# Patient Record
Sex: FEMALE | Race: WHITE | NOT HISPANIC OR LATINO | URBAN - METROPOLITAN AREA
[De-identification: names, ages, dates, MRNs, and addresses within clinical notes are randomized per-mention and may not be internally consistent; named-entity substitution may affect disease eponyms.]

---

## 2019-07-24 ENCOUNTER — PROCEDURE VISIT (OUTPATIENT)
Dept: OTOLARYNGOLOGY | Facility: CLINIC | Age: 29
End: 2019-07-24
Payer: COMMERCIAL

## 2019-07-24 ENCOUNTER — OFFICE VISIT (OUTPATIENT)
Dept: OTOLARYNGOLOGY | Facility: CLINIC | Age: 29
End: 2019-07-24
Payer: COMMERCIAL

## 2019-07-24 VITALS
HEART RATE: 85 BPM | WEIGHT: 145 LBS | DIASTOLIC BLOOD PRESSURE: 78 MMHG | HEIGHT: 66 IN | BODY MASS INDEX: 23.3 KG/M2 | SYSTOLIC BLOOD PRESSURE: 108 MMHG | OXYGEN SATURATION: 97 %

## 2019-07-24 DIAGNOSIS — H81.391 PERIPHERAL VERTIGO INVOLVING RIGHT EAR: ICD-10-CM

## 2019-07-24 DIAGNOSIS — H90.41 SENSORINEURAL HEARING LOSS (SNHL) OF RIGHT EAR WITH UNRESTRICTED HEARING OF LEFT EAR: ICD-10-CM

## 2019-07-24 DIAGNOSIS — H83.01 LABYRINTHITIS OF RIGHT EAR: Primary | ICD-10-CM

## 2019-07-24 DIAGNOSIS — H93.11 TINNITUS OF RIGHT EAR: ICD-10-CM

## 2019-07-24 DIAGNOSIS — G44.52 NEW DAILY PERSISTENT HEADACHE: ICD-10-CM

## 2019-07-24 PROCEDURE — 99244 OFF/OP CNSLTJ NEW/EST MOD 40: CPT | Mod: 25 | Performed by: OTOLARYNGOLOGY

## 2019-07-24 PROCEDURE — 99999 PR OFFICE/OUTPT VISIT,PROCEDURE ONLY: CPT | Performed by: AUDIOLOGIST

## 2019-07-24 PROCEDURE — 92567 TYMPANOMETRY: CPT | Performed by: AUDIOLOGIST

## 2019-07-24 PROCEDURE — 92557 COMPREHENSIVE HEARING TEST: CPT | Performed by: AUDIOLOGIST

## 2019-07-24 RX ORDER — CETIRIZINE HYDROCHLORIDE 10 MG/1
10 TABLET ORAL DAILY
COMMUNITY
End: 2019-07-24 | Stop reason: ALTCHOICE

## 2019-07-24 RX ORDER — ESCITALOPRAM OXALATE 20 MG/1
TABLET ORAL
COMMUNITY
Start: 2019-07-02

## 2019-07-24 RX ORDER — ALBUTEROL SULFATE 90 UG/1
AEROSOL, METERED RESPIRATORY (INHALATION)
COMMUNITY
Start: 2019-05-06 | End: 2019-12-19 | Stop reason: ALTCHOICE

## 2019-07-24 RX ORDER — ONDANSETRON 4 MG/1
TABLET, ORALLY DISINTEGRATING ORAL
Qty: 60 TABLET | Refills: 3 | Status: SHIPPED | OUTPATIENT
Start: 2019-07-24 | End: 2019-07-25 | Stop reason: SDUPTHER

## 2019-07-24 RX ORDER — BUPROPION HYDROCHLORIDE 300 MG/1
TABLET ORAL
COMMUNITY
Start: 2019-07-14

## 2019-07-24 RX ORDER — MONTELUKAST SODIUM 10 MG/1
TABLET ORAL
COMMUNITY
Start: 2019-07-02

## 2019-07-24 RX ORDER — POLYETHYLENE GLYCOL 3350 17 G/17G
17 POWDER, FOR SOLUTION ORAL DAILY
COMMUNITY

## 2019-07-24 RX ORDER — BUDESONIDE AND FORMOTEROL FUMARATE DIHYDRATE 160; 4.5 UG/1; UG/1
2 AEROSOL RESPIRATORY (INHALATION) 2 TIMES DAILY
COMMUNITY
End: 2019-07-24 | Stop reason: ALTCHOICE

## 2019-07-24 RX ORDER — PREDNISONE 10 MG/1
TABLET ORAL
Qty: 50 TABLET | Refills: 0 | Status: SHIPPED | OUTPATIENT
Start: 2019-07-24 | End: 2019-09-11 | Stop reason: ALTCHOICE

## 2019-07-24 RX ORDER — LORAZEPAM 0.5 MG/1
0.5 TABLET ORAL 2 TIMES DAILY PRN
Refills: 0 | COMMUNITY
Start: 2019-05-03

## 2019-07-24 RX ORDER — OLOPATADINE HYDROCHLORIDE 665 UG/1
SPRAY NASAL
Qty: 1 BOTTLE | Refills: 6 | Status: SHIPPED | OUTPATIENT
Start: 2019-07-24 | End: 2019-09-11 | Stop reason: ALTCHOICE

## 2019-07-24 RX ORDER — NORETHINDRONE 0.35 MG/1
TABLET ORAL
COMMUNITY
Start: 2019-07-02

## 2019-07-24 RX ORDER — METOPROLOL SUCCINATE 50 MG/1
TABLET, EXTENDED RELEASE ORAL
COMMUNITY
Start: 2019-07-03 | End: 2021-10-11 | Stop reason: ALTCHOICE

## 2019-07-24 ASSESSMENT — ENCOUNTER SYMPTOMS
CHEST TIGHTNESS: 1
PALPITATIONS: 0
BACK PAIN: 0
SHORTNESS OF BREATH: 0
ARTHRALGIAS: 1
DIARRHEA: 0
POLYPHAGIA: 0
WEAKNESS: 0
CONSTIPATION: 0
MYALGIAS: 1
VOICE CHANGE: 0
NERVOUS/ANXIOUS: 1
RHINORRHEA: 1
HEADACHES: 1
DIZZINESS: 0
COUGH: 0
TROUBLE SWALLOWING: 0
FEVER: 0
NAUSEA: 0
SLEEP DISTURBANCE: 0
FREQUENCY: 0
DYSPHORIC MOOD: 1
SINUS PRESSURE: 0
VOMITING: 0
ADENOPATHY: 0
WHEEZING: 0
FATIGUE: 0

## 2019-07-24 NOTE — PROGRESS NOTES
"    [facility]  [address], [address]  [place] [address]  Phone: [phone]  Fax: [phone]      Patient ID: Mona Traylor                              : 1990    Visit Date: 2019    Referring Provider: Valente Patel MD    Chief Complaint: Vertigo and tinnitus      Mona Traylor is a 29 y.o. female who presented to the [place] office today for consultation in regard to vertigo.    Mona developed an acute tension headache and nausea on .  She stayed home from work that day.  After resting for about 4 hours, she sat up in bed and experienced an acute onset of vertigo and \"very wavy vision\".  She did not have visual scotoma.  She did have severe sweating and intense nausea.  She vomited once.  She does have a history of migraine headache but that this was different than any headache that she had had before.  She had no weakness in her extremities nor any change in her voice.  She rested over the weekend.  She has definitely improved however she still feels dizzy when she moves around quickly.  When she lays flat she feels uncomfortable.  She likes to have her head propped up when she is in bed.  Along with the dizziness she has had tinnitus and significant loss of hearing on the right.    Mona has actually experienced tinnitus in the right ear for about 6 months.  She has also felt that her hearing on that side was slowly diminishing during this interval but that it acutely changed over the weekend.  She has never undergone otologic surgery nor has she been exposed to excessive amounts of loud noise.  She works as a  at [employer].  It should be noted that physicians have checked her right ear intermittently and have told her that she has \"chronic noninfected fluid\" in that ear.    Review of Systems   Constitutional: Negative for fatigue and fever.   HENT: Positive for congestion, hearing loss, postnasal drip, rhinorrhea and tinnitus. " "Negative for nosebleeds, sinus pressure, trouble swallowing and voice change.         Vertigo.   Eyes: Negative for visual disturbance.   Respiratory: Positive for chest tightness. Negative for cough, shortness of breath and wheezing.    Cardiovascular: Negative for chest pain and palpitations.   Gastrointestinal: Negative for constipation, diarrhea, nausea and vomiting.   Endocrine: Negative for polyphagia and polyuria.   Genitourinary: Negative for frequency.   Musculoskeletal: Positive for arthralgias and myalgias. Negative for back pain and gait problem.   Skin: Negative for rash.   Allergic/Immunologic: Negative for environmental allergies.   Neurological: Positive for headaches. Negative for dizziness and weakness.   Hematological: Negative for adenopathy.   Psychiatric/Behavioral: Positive for dysphoric mood. Negative for sleep disturbance. The patient is nervous/anxious.      Current Medications: Bupropion xl, jeremi, escitalopram, lorazepam, montelukast, polyethylene glycol, proair hfa, and toprol xl.    Past Medical History: Allergic rhinitis.  Asthma.  Hypertension.    Past Surgical History: Status post wisdom tooth extraction, left knee meniscus repair and LEEP procedure.    Social History: The patient is single.  She does not have children.  She is a .  She drinks 2 alcoholic beverages per week.  She does not use tobacco.  She has been exposed to loud music at concerts.    Family History: Father: Congestive heart failure.    Allergies: Reglan. Penicillin.  Sulfa drugs.    Physical Exam:  Vitals: /78 (BP Location: Left upper arm, Patient Position: Sitting)   Pulse 85   Ht 1.676 m (5' 6\")   Wt 65.8 kg (145 lb)   SpO2 97%   BMI 23.40 kg/m²   General:  Well-developed, well-nourished 29 y.o. in no acute distress.  Voice: Normal without hoarseness, breathiness or stridor.  Head/face:  No scars or lesions.  No parotid masses. No presinus tenderness. Seventh cranial nerves " intact.  Eyes:  Extraocular movements and gaze alignment normal.  Left beating nystagmus and fields of gaze.  Ears: Auricles normal.  External auditory canals free of cerumen.  Tympanic membranes clear, mobile but retracted more so on the right than the left.  Nose:  Dorsum straight.  Septum sinusoidal.  Turbinates pale purple and edematous but normal in size and orientation.  No pus, polyps or crusts.  Oral Cavity/oropharynx:  Normal tongue thrust. Normal gag reflex. No masses, leukoplakia, erythroplakia, ulcers or other abnormalities at the tongue, floor of mouth, buccal mucosa, palate or posterior pharyngeal wall.  Larynx/hypopharynx:  Normal supraglottis.  Vocal folds smooth and white.  Arytenoids ededmatous and mobile.  Interarytenoid and post-glottic mucosa edematous.  No masses at the base of tongue, vallecula or piriform sinuses.  Neck:  No masses, adenopathy, cervical spasm or thyroid enlargement.  Cranial Nerves II through XII: Grossly intact except for right cranial nerve VIII.  Lungs: Clear throughout.  Heart: Regular rate and rhythm.  Mental status: Awake, alert and oriented ×3.  No depression or anxiety.    Audiogram: Moderate, low-frequency rising sensorineural hearing loss on the right from 250 to 1000 Hz.  Mild sensorineural hearing loss above 4000 Hz on the right.  Normal hearing across all frequencies on the left.  Discrimination scores: 92% at 55 dB on the right, 100% at 40 dB on the left.    Tympanogram: Normal bilaterally.    Impression:  1.  Right acute labyrinthitis with hearing loss, tinnitus and vertigo versus first episode of Ménière's syndrome.  It is possible that the patient had symptoms of cochlear Ménière's syndrome as many as 6 months ago and has now developed the full syndrome with vertigo as well.  Alternative diagnoses include otologic migraine variant given the episode of head pain that preceded the full symptoms over the weekend.  Rule out retrocochlear lesion.  Marylou had  a long talk about all of these alternative diagnoses.  It will take some time to sort this out.    2.  Allergic rhinitis.  3.  Asthma.    Recommendations and Plan:  1.  Prednisone tapering down from 60 mg daily in divided doses to none over 3 weeks  2.  Simply Saline nasal mist, 2 squirts to each nostril twice daily.  3.  Flonase Sensimist, 1 squirt to each nostril in the morning.  4.  Olopatadine, 1 squirt to each nostril in the evening.  5.  Videonystagmogram.  6.  MRI brain and internal auditory canals.    7.  Meclizine, 6.25 mg once or twice daily when necessary.    8.  Zofran, 4 mg on the tongue once or twice daily for nausea and vomiting.    9.  When able, begin the habituation exercises twice daily.  10.  Follow-up in 1 month.        Krissy Hunter MD

## 2019-07-24 NOTE — PATIENT INSTRUCTIONS
1.  Prednisone twice daily for 3 days and then with breakfast for 3 weeks.  Follow the tapering dose very carefully.  2.  Simply Saline nasal mist, 2 squirts to each nostril twice daily.  3.  Flonase Sensimist, 1 squirt to each nostril in the morning.  4.  Olopatadine, 1 squirt to each nostril in the evening.  5.  Videonystagmogram to analyze in her ear balance function.  6.  MRI brain and inner ears.    7.  When able, begin the habituation exercises twice daily.  8.  Follow-up in 1 month.

## 2019-07-25 ENCOUNTER — TELEPHONE (OUTPATIENT)
Dept: OTOLARYNGOLOGY | Facility: CLINIC | Age: 29
End: 2019-07-25

## 2019-07-25 RX ORDER — ONDANSETRON 4 MG/1
TABLET, ORALLY DISINTEGRATING ORAL
Qty: 60 TABLET | Refills: 3 | Status: SHIPPED | OUTPATIENT
Start: 2019-07-25 | End: 2020-09-18 | Stop reason: SDUPTHER

## 2019-07-25 NOTE — TELEPHONE ENCOUNTER
As can be seen in the epic medicine module, this was sent yesterday afternoon, 60 pills and 3 refills.  She should contact the pharmacy it is there electronically.  This was sent to [facility] on [address] in [place].

## 2019-07-25 NOTE — TELEPHONE ENCOUNTER
Prescription for Zofran was never sent to pharmacy.  Prescription can be sent to pharmacy listed in patient chart.

## 2019-07-31 ENCOUNTER — TELEPHONE (OUTPATIENT)
Dept: TRANSFER UNIT | Age: 29
End: 2019-07-31

## 2019-07-31 RX ORDER — FLUTICASONE PROPIONATE 50 MCG
1 SPRAY, SUSPENSION (ML) NASAL DAILY
COMMUNITY
End: 2020-07-28

## 2019-08-01 ENCOUNTER — HOSPITAL ENCOUNTER (OUTPATIENT)
Dept: RADIOLOGY | Age: 29
Discharge: HOME | End: 2019-08-01
Attending: OTOLARYNGOLOGY
Payer: COMMERCIAL

## 2019-08-01 DIAGNOSIS — H93.11 TINNITUS OF RIGHT EAR: ICD-10-CM

## 2019-08-01 DIAGNOSIS — G44.52 NEW DAILY PERSISTENT HEADACHE: ICD-10-CM

## 2019-08-01 RX ORDER — GADOBUTROL 604.72 MG/ML
6.5 INJECTION INTRAVENOUS ONCE
Status: COMPLETED | OUTPATIENT
Start: 2019-08-01 | End: 2019-08-01

## 2019-08-01 RX ADMIN — GADOBUTROL 6.5 ML: 604.72 INJECTION INTRAVENOUS at 17:03

## 2019-08-02 ENCOUNTER — TELEPHONE (OUTPATIENT)
Dept: OTOLARYNGOLOGY | Facility: CLINIC | Age: 29
End: 2019-08-02

## 2019-08-02 NOTE — TELEPHONE ENCOUNTER
Patient calling in would like to know when taking the Prednisone should she follow the tapering dose or steady? She also shared since using the Flonase and Olopatadine nasal sprays her PND has been really thick and its harder for her to clear her throat. I did inform patient you will be calling her later this evening at 6pm; the best number to reach her on is [phone]. Patient voiced if you would like to address these concerns during the call she is okay with waiting to hear from you.

## 2019-08-02 NOTE — TELEPHONE ENCOUNTER
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  She already has an appointment scheduled with Dr. Frankie Mauricio at [place].  We had scheduled an appointment with Dr. Lavelle Granados at [place], but we will cancel that process.    She has had exactly one severe episode of vertigo since I saw her last.  For that reason she has not started habituation exercises yet.  She finds the sprays to drying so I suggested that she eliminate olopatadine and increase her use of saline nasal mist.  She will try to continue on the Flonase.      She has obtained a disc and the written record for her MRI.  She will follow-up with me as planned in regard to the ears.  The motion initiation issues that she has may relate directly to the glioma, but the vertigo seems to be a separate issue.  We discussed this at length.     junctional escpe beats, block pac, hr down to 39

## 2019-08-14 ENCOUNTER — TELEPHONE (OUTPATIENT)
Dept: OTOLARYNGOLOGY | Facility: CLINIC | Age: 29
End: 2019-08-14

## 2019-08-14 NOTE — TELEPHONE ENCOUNTER
Please cancel the 8/26 visit and schedule a visit for the week after the VNG. I should have the results by then.

## 2019-08-14 NOTE — TELEPHONE ENCOUNTER
Patient calling in would like to know should she come in to see you before or after she gets the VNG done?  She is scheduled for a visit with you on 8/26/19, but she wasn't able to get a appointment for the VNG until 9/5/19

## 2019-09-05 ENCOUNTER — TELEPHONE (OUTPATIENT)
Dept: OTOLARYNGOLOGY | Facility: CLINIC | Age: 29
End: 2019-09-05

## 2019-09-05 DIAGNOSIS — H83.2X1 VESTIBULAR HYPOFUNCTION OF RIGHT EAR: Primary | ICD-10-CM

## 2019-09-05 NOTE — TELEPHONE ENCOUNTER
Please let the patient know that I received the report of the VNG from today.  It shows weak balance signals at both ears.  It is unclear if this represents Ménière's disease or even an unusual migraine.  I would like her to continue on all of her medications and I want her to keep the sodium in her diet low, less than 3000 mg/day.  Also, I would like her to begin vestibular therapy at [facility].  I wrote a prescription for this.  She should have an upcoming appointment with me very soon.  If there is not one on the schedule, put her on for Wednesday, September 11.

## 2019-09-06 NOTE — TELEPHONE ENCOUNTER
I informed Mona of all.  Her appointment is actually 10/10/19 but she said it was supposed to be next Thursday.  I offered her several options for this Wednesday 9/11/19.  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 She can't even do lunch time.  She wanted to know if you could see her on 9/11/19 at 4:00.  Please advise.

## 2019-09-11 ENCOUNTER — OFFICE VISIT (OUTPATIENT)
Dept: OTOLARYNGOLOGY | Facility: CLINIC | Age: 29
End: 2019-09-11
Payer: COMMERCIAL

## 2019-09-11 VITALS
WEIGHT: 140 LBS | DIASTOLIC BLOOD PRESSURE: 70 MMHG | OXYGEN SATURATION: 98 % | BODY MASS INDEX: 22.5 KG/M2 | HEIGHT: 66 IN | HEART RATE: 89 BPM | SYSTOLIC BLOOD PRESSURE: 104 MMHG

## 2019-09-11 DIAGNOSIS — H81.4 VERTIGO OF CENTRAL ORIGIN, UNSPECIFIED LATERALITY: Primary | ICD-10-CM

## 2019-09-11 DIAGNOSIS — H69.93 DYSFUNCTION OF BOTH EUSTACHIAN TUBES: ICD-10-CM

## 2019-09-11 DIAGNOSIS — H93.11 TINNITUS OF RIGHT EAR: ICD-10-CM

## 2019-09-11 DIAGNOSIS — H83.2X3 VESTIBULAR HYPOFUNCTION OF BOTH EARS: ICD-10-CM

## 2019-09-11 DIAGNOSIS — H93.231 HYPERACUSIS OF RIGHT EAR: ICD-10-CM

## 2019-09-11 DIAGNOSIS — H90.41 SENSORINEURAL HEARING LOSS (SNHL) OF RIGHT EAR WITH UNRESTRICTED HEARING OF LEFT EAR: ICD-10-CM

## 2019-09-11 PROBLEM — F32.A ANXIETY AND DEPRESSION: Status: ACTIVE | Noted: 2019-08-13

## 2019-09-11 PROBLEM — K58.9 IBS (IRRITABLE BOWEL SYNDROME): Status: ACTIVE | Noted: 2019-08-13

## 2019-09-11 PROBLEM — F41.9 ANXIETY AND DEPRESSION: Status: ACTIVE | Noted: 2019-08-13

## 2019-09-11 PROBLEM — I10 HYPERTENSION: Status: ACTIVE | Noted: 2019-08-13

## 2019-09-11 PROCEDURE — 99213 OFFICE O/P EST LOW 20 MIN: CPT | Performed by: OTOLARYNGOLOGY

## 2019-09-11 ASSESSMENT — ENCOUNTER SYMPTOMS
VOICE CHANGE: 0
HEADACHES: 1
WEAKNESS: 0
WHEEZING: 0
POLYPHAGIA: 0
PALPITATIONS: 0
TROUBLE SWALLOWING: 0
ARTHRALGIAS: 0
VOMITING: 0
FATIGUE: 0
MYALGIAS: 0
SHORTNESS OF BREATH: 0
DIZZINESS: 1
SLEEP DISTURBANCE: 0
CONSTIPATION: 0
SINUS PRESSURE: 0
ADENOPATHY: 0
BACK PAIN: 0
FREQUENCY: 0
NAUSEA: 1
RHINORRHEA: 0
NERVOUS/ANXIOUS: 0
COUGH: 0
FEVER: 0
SORE THROAT: 1
DYSPHORIC MOOD: 0
DIARRHEA: 0

## 2019-09-11 NOTE — PROGRESS NOTES
[facility]  [address], [address]  [place] [address]  Phone: [phone]  Fax: [phone]      Patient ID: Mona Traylor                              : 1990    Visit Date: 2019  Referring Provider: Valente Patel MD    Chief Complaint: Results (VNG)      Mona Traylor returned to the [place] office today in regard to history of acute labyrinthitis with right sensorineural hearing loss, tinnitus, hyperacusis and intermittent vertigo.    I last saw Mona at her first visit in July.  She had evidence of right acute labyrinthitis with hearing loss, tinnitus and vertigo.  We discussed the fact that the symptoms could also represent the first episode of Ménière's disease.  Migraine associated vertigo was also in the differential.  I placed her on high-dose prednisone as well as saline, Flonase and olopatadine for allergic rhinitis.  Since seeing me last she has undergone both an MRI of the brain and internal auditory canals and video nystagmogram.  The MRI appeared to reveal a low-grade glioma in the frontal motor cortex.  Fortunately,  Dr. Frankie Mauricio, the chief of neurosurgery at [place] believes that this lesion is more likely to be a benign fibrous mass such as a hamartoma.  Mona will undergo another MRI next month.    Video nystagmogram revealed bilateral labyrinthine hypofunction which was unexpected.  This could be consistent with Ménière's disease or migraine.  The diagnosis of acute labyrinthitis with slow resolution is still in play as well.  The frontal mass, regardless of tissue type, is not the source of the patient's symptoms.    At present, Mona feels a generalized feeling of subtle dizziness virtually at all times.  She has only had true vertigo 2 or 3 times since she saw me last.  She has used a small amount of meclizine each time the vertigo has occurred.  Sometimes she has nausea without vertigo and takes the Zofran.  The fullness in her right ear has decreased as  "has the tinnitus.  However, she still has hyperacusis if she is in a very noisy place.  He also has acute symptoms at the onset of a viral upper respiratory infection.  Over the past 48 hours, her throat has become sore and her nose has become stuffy.  She has thick clear postnasal drainage.  She does not yet have a cough, shortness of breath or wheezing.      Review of Systems   Constitutional: Negative for fatigue and fever.   HENT: Positive for congestion, hearing loss, postnasal drip, sore throat and tinnitus. Negative for nosebleeds, rhinorrhea, sinus pressure, trouble swallowing and voice change.         Vertigo.  Hyperacusis.   Eyes: Negative for visual disturbance.   Respiratory: Negative for cough, shortness of breath and wheezing.    Cardiovascular: Negative for chest pain and palpitations.   Gastrointestinal: Positive for nausea. Negative for constipation, diarrhea and vomiting.   Endocrine: Negative for polyphagia and polyuria.   Genitourinary: Negative for frequency.   Musculoskeletal: Negative for arthralgias, back pain, gait problem and myalgias.   Skin: Negative for rash.   Allergic/Immunologic: Negative for environmental allergies.   Neurological: Positive for dizziness and headaches. Negative for weakness.   Hematological: Negative for adenopathy.   Psychiatric/Behavioral: Negative for dysphoric mood and sleep disturbance. The patient is not nervous/anxious.      Current Medications: Bupropion xl, jeremi, escitalopram, fluticasone propionate, lorazepam, montelukast, ondansetron odt, polyethylene glycol, proair hfa, and toprol xl.    Physical Exam:  Vitals: /70 (BP Location: Left upper arm, Patient Position: Sitting)   Pulse 89   Ht 1.676 m (5' 6\")   Wt 63.5 kg (140 lb)   SpO2 98%   BMI 22.60 kg/m²   General:  Well-developed, well-nourished 29 y.o. who is in no acute distress.  Voice: Normal without hoarseness, breathiness or stridor.  Head/face:  No scars or lesions.  No parotid masses. " No presinus tenderness. Seventh cranial nerves intact.  Eyes:  Extraocular movements and gaze alignment normal.  No spontaneous or inducible nystagmus.  Ears: Auricles normal.  External auditory canals free of cerumen.  Tympanic membranes clear, mobile and without retraction or scar.  Nose:  Dorsum straight.  Septum sinsusoidal.  Turbinates purple and edematous but normal in size and orientation.  No pus, polyps or crusts.  Oral Cavity/oropharynx: Normal tongue thrust. Normal gag reflex. No masses, leukoplakia, erythroplakia, ulcers or other abnormalities at the tongue, floor of mouth or buccal mucosa.  Vascular ectasia scattered across the soft palate and posterior pharyngeal wall with clear secretions consistent with mild viral pharyngitis.  Larynx/hypopharynx:  Normal supraglottis.  Vocal folds smooth and white.  Arytenoids edematous and mobile.  Interarytenoid and post-glottic mucosa edematous.  No masses at the base of tongue, vallecula or piriform sinuses.  Neck:  No masses, adenopathy, cervical spasm or thyroid enlargement.  Cranial Nerves II through XII: Grossly intact step for right cranial nerve VIII.  Mental status: Awake, alert and oriented ×3.  No anxiety or depression.      Impression:  1.  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  Slowly resolving right acute labyrinthitis and migraine associated vertigo are also possibilities.  2.  Acute viral URI just beginning.  3.  Allergic rhinitis.  4.  Relative dehydration and consistently low blood pressure at several doctor visits.  The patient needs to hydrate herself better through the day.  I am hesitant to place her on a diuretic at this time.    Recommendations and Plan:  1. Trial vestibular therapy.  Patient is aware that we will discontinue this therapy if her daily dizziness increases.  2. Drink plenty of water and keep a low sodium diet. Sodium tracker dannielle for [place].  Eat 3 meals per day!!  3. Consider musician's plugs.  4. Follow  up in 1 month for audiogram.  5. Increase the saline nasal spray to 3x/day. Afrin 12 hour decongestant, 2 squirts to each nostril twice daily for 3 days. Gargle salt water with peroxide. Mucinex 600 mg daily. Vitamin C - EmergenC or 1000 mg/day.  Resume Flonase in a few days.        Krissy Hunter MD

## 2019-09-11 NOTE — PATIENT INSTRUCTIONS
1. Try the vestibular therapy.  2. Drink plenty of water and keep a low sodium diet. Sodium tracker dannielle for [employer].  Eat 3 meals per day!!  3. Consider musician's plugs.  4. Follow up in 1 month for audiogram.  5. Increase the saline to 3x/day. Afrin 12 hour decongestant, 2 squirts to each nostril twice daily for 3 days. Gargle salt water with peroxide. Mucinex 600 mg daily. Vitamin C - EmergenC or 1000 mg/day.

## 2019-09-12 ENCOUNTER — TELEPHONE (OUTPATIENT)
Dept: OTOLARYNGOLOGY | Facility: CLINIC | Age: 29
End: 2019-09-12

## 2019-09-12 NOTE — TELEPHONE ENCOUNTER
"Pt called wanting to make you aware that she did try to schedule vestibular therapy yesterday per your suggestion, however she was told that they would not be able to see her \"anytime soon.\" They put her on a wait list, but she was never told hold long it may take to get an appt. She wanted to know is you suggest she try another facility? Please advise, thank you.  "

## 2019-09-12 NOTE — TELEPHONE ENCOUNTER
That certainly sounds odd.  I believe that she tried the [facility].  Perhaps she can try the [facility] in [place] or [facility] closer to her home.

## 2019-09-19 ENCOUNTER — HOSPITAL ENCOUNTER (OUTPATIENT)
Dept: PHYSICAL THERAPY | Facility: HOSPITAL | Age: 29
Setting detail: THERAPIES SERIES
Discharge: HOME | End: 2019-09-19
Attending: OTOLARYNGOLOGY
Payer: COMMERCIAL

## 2019-09-19 VITALS — OXYGEN SATURATION: 100 % | SYSTOLIC BLOOD PRESSURE: 104 MMHG | HEART RATE: 67 BPM | DIASTOLIC BLOOD PRESSURE: 72 MMHG

## 2019-09-19 DIAGNOSIS — H83.2X1 VESTIBULAR HYPOFUNCTION OF RIGHT EAR: ICD-10-CM

## 2019-09-19 PROCEDURE — 97530 THERAPEUTIC ACTIVITIES: CPT | Mod: GP

## 2019-09-19 PROCEDURE — 97163 PT EVAL HIGH COMPLEX 45 MIN: CPT

## 2019-09-19 RX ORDER — BUDESONIDE AND FORMOTEROL FUMARATE DIHYDRATE 160; 4.5 UG/1; UG/1
2 AEROSOL RESPIRATORY (INHALATION) 2 TIMES DAILY
COMMUNITY
End: 2019-12-19 | Stop reason: ALTCHOICE

## 2019-09-20 NOTE — OP PT TREATMENT LOG
VESTIBULAR PT FLOWSHEET    PT Vestibular Exercises Current Session Time Done Today   NEURO RE-ED TOTAL TIME FOR SESSION Not performed              VOR CANCELLATION     Standing H/VVOR-C     Ambulation w/ H/VVOR-C     VOR / GAZE STABILITY     Standing H/VVOR     Ambulation w/H/VVOR     H/VVOR on compliant surfaces     H/VVOR on sway surfaces     Functional VOR     DVA/GST TBA    HABITUATION     Ball circles     Repetitive functional movements     MSQ TBA    BALANCE- STATIC     On floor     Airex foam     Rockerboard          SOT 9/19: WNL on IE Y   BALANCE- DYNAMIC     Ambulation -head turns/nods/EC     Amb - 180 & 360 degree turns     Retro ambulation EO/EC     Obstacles     FGA TBA    OPTOKINETIC STIMULATION          THER-EX  TOTAL TIME FOR SESSION Not performed    CARDIOVASCULAR      Recumbent bike     Treadmill      THER ACT TOTAL TIME FOR SESSION 8-22 Minutes    Patient Education 9/19: results of testing, function of vestibular system Y     BPPV: TBA  Test Nystagmus Symptoms Comments   R [place] Hallpike      L [place] Hallpike      Deep head hang      Sit to supine      Horizontal Roll Test to right      Horizontal Roll Test to left      [place] and [place] (Neutral, Flexion, Extension)         Sidelying test right         Sidelying test left

## 2019-09-20 NOTE — PROGRESS NOTES
Referring Provider: By co-signing this Plan of Care (POC), you agree with the planned services and interventions recommended by the therapist.         PT EVALUATION FOR OUTPATIENT THERAPY    Patient: Mona Traylor    MRN: [id number]  : 1990 29 y.o.   Referring Physician: Krissy Hunter MD  Date of Visit: 2019      Certification Dates:   19 through 19    Recommended Frequency & Duration:  1 time/week for up to 3 months     Diagnosis:   1. Vestibular hypofunction of right ear        Chief Complaints:   Chief Complaint   Patient presents with   • Balance Deficits   • Difficulty Walking   • Dizziness       Precautions: fall    Past Medical History:   Past Medical History:   Diagnosis Date   • Asthma    • Hypertension    • Tinnitus        Past Surgical History:   Past Surgical History:   Procedure Laterality Date   • CERVICAL BIOPSY  W/ LOOP ELECTRODE EXCISION     • MENISCECTOMY Left    • WISDOM TOOTH EXTRACTION           LEARNING ASSESSMENT    Assessment completed: Yes    Learner name:  Mona Traylor    Relationship: Patient    Learning Barriers:  Learning barriers: No Barriers    Preferred Language: English     Needed: No    Learning New Concepts: Listening, Demonstration and Pictures/Video      CO-LEARNER ASSESSMENT:    Completed: Other: N/A            OBJECTIVE MEASUREMENTS/DATA:    Eval Assessment          Evaluation Assessment and Plan - 19 1900        Evaluation Assessment and Plan    Plan of Care reviewed and patient/family in agreement Yes    System Pathology/Pathophysiology Noted other (see comments)   vestibular    Functional Limitations in Following Categories (PT Eval) home management;work;community/leisure    Rehab Potential/Prognosis good, to achieve stated therapy goals    Problem List impaired balance;other (see comments)   dizziness    Actions taken instructed in VORx1 ex's    Clinical Assessment see clinical assessment below    Planned Services CPT  "61677 Therapeutic activities;CPT 95189 Canalith repositioning procedure/maneuvers;CPT 27141 Neuromuscular Reeducation;CPT 12539 Therpeutic exercises;CPT 20678 Hot/Cold Packs therapy;CPT 69083 Gait training        General Info         General Information - 09/19/19 1505        Session Details    Document Type initial evaluation    Mode of Treatment physical therapy    OP Specialty Vestibular       Time Calculation    Start Time 1505    Stop Time 1606    Time Calculation (min) 61 min       General Information    Patient Profile Reviewed? yes    Pertinent History of Current Functional Problem Pt woke up in July from a nap and had a severe vertigo attack, got sick with diaphoresis x 30 mins. 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 At first they thought it was a glioma, but then saw a neurosurgeon at [place], ordered a 2nd MRI. Pt had VNG testing and was diagnosed with either Meniere's or labyrithitis.  Last fall pt had fullness in the R ear. Pt had an audiogram with slight hearing loss of low frequency.  No falls.  The last few days she is feeling better, but started to feel \"off\" in the waiting room. Her eyes have difficulty focusing. No vertigo recently, but imbalance. She stumbles and her eye hand coordination is impaired.  Pt has a hx of anxiety. She has meclizine, but tries not to take it. She also take Zofran more than the meclizine. Looking at screens, bending over and feeling exerted causes more imbalance.     Existing Precautions/Restrictions fall        Pain and Vitals         Pain/Vitals - 09/19/19 1520        Pain/Comfort/Sleep    Presence of Pain denies       Pre Activity Vital Signs    Pulse 67    SpO2 100 %    /72       Pain Intervention    Intervention  N/A    Post Intervention Comments N/A        Falls Assessment          Falls - 09/19/19 1900        Initial Falls Assessment    One or more falls in the last year No        Living Environment          Living Environment " - 09/19/19 1519        Living Environment    Lives With significant other    Living Arrangements apartment        PLOF         Prior Level of Function - 09/19/19 1519        OTHER    Previous level of function Totally balanced and independent. Pt did not exercise. Works doing  work.         Vestibular          PT Vestibular Evaluation - 09/19/19 1500        Vestibular/Ocular    Corrective lenses Distance    Eye ROM WNL    Convergence Vision WNL    Divergent Vision WNL    Pupil Alignment WNL    Cover/Uncover WNL    Cover/Cross Over WNL    Smooth Pursuit/Small Target WNL    Saccades WNL    Vestibular Ocular Reflex (VOR) Abnormal   queasy; dizziness with VVOR    Spontaneous Evoked Nystagmus WNL    Gaze-Evoked Nystagmus WNL    Head Thrust Test WNL    VOR Cancellation WNL       Balance Master    SOT Composite Norms WNL    SOT Composite Score 80    Somatosensory WNL   94    Visual WNL   87    Vestibular WNL   74    Visual Preference WNL   100            Goals     • Patient stated goals (pt-stated)            To return to feeling 100%      • Vestibular Goals            Short Term Goals Time Frame Result Comment/Progress   Patient will decrease Motion Sensitivity Quotient to TBA for increased functional tolerance.   4-6 weeks     Patient will increase FGA score to TBA for increased gait stability and balance.   4-6 weeks     Patient will improve DVA (Dynamic Visual Acuity) to </= TBA for functional improved gaze stability. 4-6 weeks     Patient will achieve TBA on GST (Gaze Stability Test) for improved gaze stability.   4-6 weeks     Patient will perform home exercise program with supervision.   4-6 weeks     Patient will tolerate 10 minutes of cardiovascular conditioning. 4-6 weeks     Patient will negotiate curbs/ramps/uneven surfaces without LOB. 4-6 weeks     Patient will have negative BPPV all canals for symptom-free functional mobility. 4-6 weeks               Long Term Goals Time Frame Result  Comment/Progress   Motion Sensitivity Quotient to <2% for increased functional tolerance.   8-12 weeks     Patient will increase FGA score to >24/30 for increased gait stability, safety and functional mobility.   8-12 weeks     Patient will decrease DVA to < or =2 lines for functional improved gaze stability. 8-12 weeks     Patient will increase GST scores to >120 deg/sec for improved gaze stability.   8-12 weeks     Patient will perform home exercise program independently.   8-12 weeks     Patient will tolerate 15 minutes of cardiovascular conditioning. 8-12 weeks     Patient will ambulate in community without LOB and without dizziness. 8-12 weeks     Patient will have negative BPPV all canals for symptom-free functional mobility. 8-12 weeks     Patient will return to (driving / work / school / sports).   8-12 weeks     Patient will improve score on [place] to WFL for decreased report of symptoms with daily activities. 8-12 weeks     Patient will have no increased symptoms with challenging VOR activities for symptom free high level activities.   8-12 weeks                             TREATMENT PLAN:      VESTIBULAR PT FLOWSHEET    PT Vestibular Exercises Current Session Time Done Today   NEURO RE-ED TOTAL TIME FOR SESSION Not performed              VOR CANCELLATION     Standing H/VVOR-C     Ambulation w/ H/VVOR-C     VOR / GAZE STABILITY     Standing H/VVOR     Ambulation w/H/VVOR     H/VVOR on compliant surfaces     H/VVOR on sway surfaces     Functional VOR     DVA/GST TBA    HABITUATION     Ball circles     Repetitive functional movements     MSQ TBA    BALANCE- STATIC     On floor     Airex foam     Rockerboard          SOT 9/19: WNL on IE Y   BALANCE- DYNAMIC     Ambulation -head turns/nods/EC     Amb - 180 & 360 degree turns     Retro ambulation EO/EC     Obstacles     FGA TBA    OPTOKINETIC STIMULATION          THER-EX  TOTAL TIME FOR SESSION Not performed    CARDIOVASCULAR      Recumbent bike      Treadmill      THER ACT TOTAL TIME FOR SESSION 8-22 Minutes    Patient Education 9/19: results of testing, function of vestibular system Y     BPPV: TBA  Test Nystagmus Symptoms Comments   R Sean Hallpike      L [place] Hallpike      Deep head hang      Sit to supine      Horizontal Roll Test to right      Horizontal Roll Test to left      [place] and Lean (Neutral, Flexion, Extension)         Sidelying test right         Sidelying test left                     ASSESSMENT:    Patient is a 29 year old female presenting to physical therapy status with an onset of dizziness in July when she woke up from a nap. Pt currently presents with symptoms of dizziness, imbalance and disorientation, fullness and tinnitus in the R ear and hearing loss. Oculomotor testing is WNL. VOR testing is abnormal with dizziness in vertical direction. Sensory Organization testing on Balance Master is WNL. Additional testing TBA include BPPV testing using Frenzel goggles and tilt table, Dynamic Visual Acuity testing, Gaze Stabilization testing, Functional Gait Assessment and Motion Sensitivity Quotient. Pt would benefit from physical therapy for vestibular rehab, balance and gait training, adaptation and habituation training to decrease symptoms of imbalance and dizziness and to improve their functional abilities.     Planned Services: The patient's treatment will include CPT 59333 Therapeutic activities, CPT 91241 Canalith repositioning procedure/maneuvers, CPT 01614 Neuromuscular Reeducation, CPT 59436 Therpeutic exercises, CPT 93656 Hot/Cold Packs therapy, CPT 68849 Gait training, .

## 2019-09-25 ENCOUNTER — HOSPITAL ENCOUNTER (OUTPATIENT)
Dept: PHYSICAL THERAPY | Facility: HOSPITAL | Age: 29
Setting detail: THERAPIES SERIES
Discharge: HOME | End: 2019-09-25
Attending: OTOLARYNGOLOGY
Payer: COMMERCIAL

## 2019-09-25 DIAGNOSIS — H83.2X1 VESTIBULAR HYPOFUNCTION OF RIGHT EAR: Primary | ICD-10-CM

## 2019-09-25 PROCEDURE — 97112 NEUROMUSCULAR REEDUCATION: CPT | Mod: GP

## 2019-09-25 PROCEDURE — 97530 THERAPEUTIC ACTIVITIES: CPT | Mod: GP

## 2019-09-25 NOTE — PROGRESS NOTES
PT DAILY NOTE FOR OUTPATIENT THERAPY    Patient: Mona Traylor   MRN: [id number]  : 1990 29 y.o.  Referring Physician: Krissy Hunter MD  Date of Visit: 2019      Certification Dates: 19 through 19    Diagnosis:   1. Vestibular hypofunction of right ear        Chief Complaints:   Chief Complaint   Patient presents with   • Difficulty Walking   • Balance Deficits   • Dizziness   • Other     nausea, hearing loss   • Decreased Mobility       Precautions: fall, other (see comments) (dizziness)      TODAY'S VISIT          General Information - 19 1508        Session Details    Document Type daily treatment    Mode of Treatment physical therapy    Patient/Family Observations Pt woke up on  midday and experienced vertigo when she got up from the R side. She felt nauseous and put an ice pack on her head. Pt took a meclizine which helped. Her ear has been ringing and is more sensitive to noise. Pt has had extreme nausea since lunchtime.  Monday she had a chiropractic treatment with heat and e-stim on her stomach. When she got up she felt unstable with vertigo.     OP Specialty Vestibular       Time Calculation    Start Time 1506    Stop Time 1605    Time Calculation (min) 59 min       General Information    Patient Profile Reviewed? yes    Referring Physician Dr Hunter    Existing Precautions/Restrictions fall;other (see comments)   dizziness              Pain/Vitals - 19 1511        Pain/Comfort/Sleep    Presence of Pain denies       Pain Intervention    Intervention  N/A    Post Intervention Comments N/A              Daily Falls Screen - 19 1512        Daily Falls Assessment    Patient reported fall since last visit No              Daily Treatment Assessment and Plan - 19 1621        Daily Treatment Assessment and Plan    Progress toward goals Progressing    Daily Outcome Summary MSQ was assessed and pt is moderate motion sensitive (17.6%). FGA is   indicating low fall risk. Frenzel exam is negative. BPPV is negative for all canals. Pt was instructed in the antomy of the vestibular system and was explained the results of her testing. She was given seated VORx1 ex's for home since she experienced mild dizziness in the clinic today doing them.     Plan and Recommendations Assess DVA/GST next session. Begin habituation training and gaze stability training.           OBJECTIVE DATA TAKEN TODAY:    Vestibular          PT Vestibular Evaluation - 09/25/19 1500        Frenzel's Exam    Spontaneous Nystagmus WNL    Gaze Evoked Nystagmus WNL    Convergence Spasm WNL       BPPV    Right Sean Hallpike  WNL;Modified with tilt table    Right [place] Hallpike Comment increased dizziness and nausea    Left [place] Hallpike  WNL;Modified with tilt table    Left [place] Hallpike Comments increased dizziness and nausea    Sit to Supine comments negative    Horizontal Roll Test to Right  WNL    Horizontal Roll Test to Left  WNL       Motion Sensitivity    Motion Sensitivity Quotient Percentage (%) 17.6 percent    Motion Sensitivity number of provoking positions 7       Balance    FGA Score (out of 30 total) 27       Other Outcome Measures    Dizziness Handicap Inventory Score 34          Today's Treatment:      VESTIBULAR PT FLOWSHEET    PT Vestibular Exercises Current Session Time Done Today   NEURO RE-ED TOTAL TIME FOR SESSION 38-52 Minutes    CP to neck for nausea During BPPV testing Y        VOR CANCELLATION     Standing H/VVOR-C     Ambulation w/ H/VVOR-C     VOR / GAZE STABILITY     Seated H/VVOR H/VVOR using med B on goni:  At SSS x 30 sec each (mild dizziness, vertical >horizontal) Y   Standing H/VVOR     Ambulation w/H/VVOR     H/VVOR on compliant surfaces     H/VVOR on sway surfaces     Functional VOR     DVA/GST TBA    HABITUATION     Ball circles     Repetitive functional movements     MSQ 9/25: 17.6% Y   BALANCE- STATIC     On floor     Airex foam     Rockerboard          SOT  9/19: WNL on IE Y   BALANCE- DYNAMIC     Ambulation -head turns/nods/EC     Amb - 180 & 360 degree turns     Retro ambulation EO/EC     Obstacles     FGA 9/25: 27/30 Y   OPTOKINETIC STIMULATION          THER-EX  TOTAL TIME FOR SESSION Not performed    CARDIOVASCULAR      Recumbent bike     Treadmill      THER ACT TOTAL TIME FOR SESSION 8-22 Minutes    Patient Education 9/19: results of testing, function of vestibular system  9/25: results of testing, education on the anatomy of vestibular system, education in a HEP (seated VORx1) Y     BPPV: 9/25/19  Test Nystagmus Symptoms Comments   R Sean Hallpike neg + dizziness    L [place] Hallpike neg + dizziness    Deep head hang      Sit to supine neg     Horizontal Roll Test to right neg none    Horizontal Roll Test to left neg none    [place] and Lean (Neutral, Flexion, Extension)         Sidelying test right         Sidelying test left

## 2019-09-25 NOTE — OP PT TREATMENT LOG
VESTIBULAR PT FLOWSHEET    PT Vestibular Exercises Current Session Time Done Today   NEURO RE-ED TOTAL TIME FOR SESSION 38-52 Minutes    CP to neck for nausea During BPPV testing Y        VOR CANCELLATION     Standing H/VVOR-C     Ambulation w/ H/VVOR-C     VOR / GAZE STABILITY     Seated H/VVOR H/VVOR using med B on goni:  At SSS x 30 sec each (mild dizziness, vertical >horizontal) Y   Standing H/VVOR     Ambulation w/H/VVOR     H/VVOR on compliant surfaces     H/VVOR on sway surfaces     Functional VOR     DVA/GST TBA    HABITUATION     Ball circles     Repetitive functional movements     MSQ 9/25: 17.6% Y   BALANCE- STATIC     On floor     Airex foam     Rockerboard          SOT 9/19: WNL on IE Y   BALANCE- DYNAMIC     Ambulation -head turns/nods/EC     Amb - 180 & 360 degree turns     Retro ambulation EO/EC     Obstacles     FGA 9/25: 27/30 Y   OPTOKINETIC STIMULATION          THER-EX  TOTAL TIME FOR SESSION Not performed    CARDIOVASCULAR      Recumbent bike     Treadmill      THER ACT TOTAL TIME FOR SESSION 8-22 Minutes    Patient Education 9/19: results of testing, function of vestibular system  9/25: results of testing, education on the anatomy of vestibular system, education in a HEP (seated VORx1) Y     BPPV: 9/25/19  Test Nystagmus Symptoms Comments   R [place] Hallpike neg + dizziness    L [place] Hallpike neg + dizziness    Deep head hang      Sit to supine neg     Horizontal Roll Test to right neg none    Horizontal Roll Test to left neg none    [place] and Lean (Neutral, Flexion, Extension)         Sidelying test right         Sidelying test left

## 2019-10-08 ENCOUNTER — HOSPITAL ENCOUNTER (OUTPATIENT)
Dept: PHYSICAL THERAPY | Facility: HOSPITAL | Age: 29
Setting detail: THERAPIES SERIES
Discharge: HOME | End: 2019-10-08
Attending: OTOLARYNGOLOGY
Payer: COMMERCIAL

## 2019-10-08 DIAGNOSIS — H83.2X1 VESTIBULAR HYPOFUNCTION OF RIGHT EAR: Primary | ICD-10-CM

## 2019-10-08 PROCEDURE — 97112 NEUROMUSCULAR REEDUCATION: CPT | Mod: GP

## 2019-10-08 PROCEDURE — 97530 THERAPEUTIC ACTIVITIES: CPT | Mod: GP

## 2019-10-08 NOTE — OP PT TREATMENT LOG
VESTIBULAR PT FLOWSHEET    PT Vestibular Exercises Current Session Time Done Today   NEURO RE-ED TOTAL TIME FOR SESSION 38-52 Minutes    CP to neck for nausea During BPPV testing N        VOR CANCELLATION     Standing H/VVOR-C     Ambulation w/ H/VVOR-C     VOR / GAZE STABILITY     Seated H/VVOR H/VVOR using med B on goni:  At SSS x 30 sec each (mild dizziness, vertical >horizontal) N   Standing H/VVOR NBOS HVOR using med B on plain wall, tempo 105 BPM x 45 sec then x30 sec (+ imbalance and dizziness and nausea)  NBOS VVOR using med B on plain wall, tempo 105  BPM 2x 45 sec  (+imbalance and dizziness and nausea) Y   Ambulation w/H/VVOR     H/VVOR on compliant surfaces     H/VVOR on sway surfaces     Functional VOR     DVA/GST PTT WNL, DVA WNL, GST WNL both horizontal and vertical Y   HABITUATION     Ball circles     Repetitive functional movements Supine to sit  Nose to knee  Head turns L to R  Head nods up and down  180 deg turns  Wall rolls    MSQ 9/25: 17.6% N   BALANCE- STATIC     On floor     Airex foam WBOS EO/EC x 1 min each  NBOS EO/EC x 1 min each Y   Rockerboard          SOT 9/19: WNL on IE N   BALANCE- DYNAMIC     Ambulation -head turns/nods/EC     Amb - 180 & 360 degree turns     Retro ambulation EO/EC     Obstacles     FGA 9/25: 27/30 N   OPTOKINETIC STIMULATION          THER-EX  TOTAL TIME FOR SESSION Not performed    CARDIOVASCULAR      Recumbent bike     Treadmill      THER ACT TOTAL TIME FOR SESSION 8-22 Minutes    Patient Education 9/19: results of testing, function of vestibular system  9/25: results of testing, education on the anatomy of vestibular system, education in a HEP (seated VORx1)  10/8: results of testing; advanced HEP to standing using metronome at 105 BPM x 30-45 sec each and standing on pillow in corner EO and EC Y     BPPV: 9/25/19  Test Nystagmus Symptoms Comments   R [place] Hallpike neg + dizziness    L [place] Hallpike neg + dizziness    Deep head hang      Sit to supine neg      Horizontal Roll Test to right neg none    Horizontal Roll Test to left neg none    [place] and Lean (Neutral, Flexion, Extension)         Sidelying test right         Sidelying test left

## 2019-10-08 NOTE — PROGRESS NOTES
PT DAILY NOTE FOR OUTPATIENT THERAPY    Patient: Mona Traylor   MRN: [id number]  : 1990 29 y.o.  Referring Physician: Krissy Hunter MD  Date of Visit: 10/8/2019      Certification Dates: 19 through 19    Diagnosis:   1. Vestibular hypofunction of right ear        Chief Complaints:   Chief Complaint   Patient presents with   • Difficulty Walking   • Balance Deficits   • Dizziness   • Vertigo   • Decreased Mobility   • Pain     HA's       Precautions: fall, other (see comments) (dizziness)      TODAY'S VISIT          General Information - 10/08/19 1704        Session Details    Document Type daily treatment    Mode of Treatment physical therapy    Patient/Family Observations Pt has been hesitant to do B exercise before she leaves in the morning and in the evening because of fear of the dizziness worsening. There was an episode where she felt things swirling with nausea and rested without meds. Symptoms resolved for the most part.  Pt feels lightheadedness when she gets up too quickly.     OP Specialty Vestibular       Time Calculation    Start Time 1703    Stop Time 1801    Time Calculation (min) 58 min       General Information    Patient Profile Reviewed? yes    Referring Physician Dr Hunter    Existing Precautions/Restrictions fall;other (see comments)   dizziness              Pain/Vitals - 10/08/19 1706        Pain/Comfort/Sleep    Presence of Pain denies       Pain Intervention    Intervention  N/A    Post Intervention Comments N/A              Daily Falls Screen - 10/08/19 1706        Daily Falls Assessment    Patient reported fall since last visit No              Daily Treatment Assessment and Plan - 10/08/19 1818        Daily Treatment Assessment and Plan    Progress toward goals Progressing    Daily Outcome Summary DVA and GST were assessed and were both WNL in horizontal and vertical directions. Pt experienced imbalance, dizziness and nausea with HVOR and VVOR in standing  however using a medium size B on a plain wall. Pt needs to sit and let symptoms settle down. Pt was instructed to do gaze stability ex's in standing at home while standing by a stable surface to hold if needed. Pt was also instructed to  a corner of a room on a pillow with eyes opened and closed.     Plan and Recommendations Continue to advance gaze stabilization ex's, balance training and begin habituation training.           OBJECTIVE DATA TAKEN TODAY:    Vestibular          PT Vestibular Evaluation - 10/08/19 1800        Dynamic Vision Testing    Perception Time Test WNL (<60 msec)   20 msec    Gaze Stabilization Test WNL   Down 153, Up 142, L 164, R 159 deg/sec    Dynamic Visual Acuity WNL (</equal to 2.0 line difference)   L 2.1, R 2.0, Down 1.0, Up 2.0 lines          Today's Treatment:      VESTIBULAR PT FLOWSHEET    PT Vestibular Exercises Current Session Time Done Today   NEURO RE-ED TOTAL TIME FOR SESSION 38-52 Minutes    CP to neck for nausea During BPPV testing N        VOR CANCELLATION     Standing H/VVOR-C     Ambulation w/ H/VVOR-C     VOR / GAZE STABILITY     Seated H/VVOR H/VVOR using med B on goni:  At SSS x 30 sec each (mild dizziness, vertical >horizontal) N   Standing H/VVOR NBOS HVOR using med B on plain wall, tempo 105 BPM x 45 sec then x30 sec (+ imbalance and dizziness and nausea)  NBOS VVOR using med B on plain wall, tempo 105  BPM 2x 45 sec  (+imbalance and dizziness and nausea) Y   Ambulation w/H/VVOR     H/VVOR on compliant surfaces     H/VVOR on sway surfaces     Functional VOR     DVA/GST PTT WNL, DVA WNL, GST WNL both horizontal and vertical Y   HABITUATION     Ball circles     Repetitive functional movements Supine to sit  Nose to knee  Head turns L to R  Head nods up and down  180 deg turns  Wall rolls    MSQ 9/25: 17.6% N   BALANCE- STATIC     On floor     Airex foam WBOS EO/EC x 1 min each  NBOS EO/EC x 1 min each Y   Rockerboard          SOT 9/19: WNL on IE N   BALANCE-  DYNAMIC     Ambulation -head turns/nods/EC     Amb - 180 & 360 degree turns     Retro ambulation EO/EC     Obstacles     FGA 9/25: 27/30 N   OPTOKINETIC STIMULATION          THER-EX  TOTAL TIME FOR SESSION Not performed    CARDIOVASCULAR      Recumbent bike     Treadmill      THER ACT TOTAL TIME FOR SESSION 8-22 Minutes    Patient Education 9/19: results of testing, function of vestibular system  9/25: results of testing, education on the anatomy of vestibular system, education in a HEP (seated VORx1)  10/8: results of testing; advanced HEP to standing using metronome at 105 BPM x 30-45 sec each and standing on pillow in corner EO and EC Y     BPPV: 9/25/19  Test Nystagmus Symptoms Comments   R Sean Hallpike neg + dizziness    L [place] Hallpike neg + dizziness    Deep head hang      Sit to supine neg     Horizontal Roll Test to right neg none    Horizontal Roll Test to left neg none    [place] and Lean (Neutral, Flexion, Extension)         Sidelying test right         Sidelying test left

## 2019-10-10 ENCOUNTER — OFFICE VISIT (OUTPATIENT)
Dept: OTOLARYNGOLOGY | Facility: CLINIC | Age: 29
End: 2019-10-10
Payer: COMMERCIAL

## 2019-10-10 ENCOUNTER — PROCEDURE VISIT (OUTPATIENT)
Dept: OTOLARYNGOLOGY | Facility: CLINIC | Age: 29
End: 2019-10-10
Payer: COMMERCIAL

## 2019-10-10 VITALS
SYSTOLIC BLOOD PRESSURE: 106 MMHG | WEIGHT: 140 LBS | BODY MASS INDEX: 22.5 KG/M2 | OXYGEN SATURATION: 98 % | HEIGHT: 66 IN | DIASTOLIC BLOOD PRESSURE: 72 MMHG | HEART RATE: 84 BPM

## 2019-10-10 DIAGNOSIS — H83.2X3 VESTIBULAR HYPOFUNCTION OF BOTH EARS: ICD-10-CM

## 2019-10-10 DIAGNOSIS — H93.11 TINNITUS OF RIGHT EAR: ICD-10-CM

## 2019-10-10 DIAGNOSIS — H93.11 SUBJECTIVE TINNITUS OF RIGHT EAR: ICD-10-CM

## 2019-10-10 DIAGNOSIS — H90.41 SENSORINEURAL HEARING LOSS (SNHL) OF RIGHT EAR WITH UNRESTRICTED HEARING OF LEFT EAR: Primary | ICD-10-CM

## 2019-10-10 DIAGNOSIS — H93.291 ABNORMAL AUDITORY PERCEPTION OF RIGHT EAR: ICD-10-CM

## 2019-10-10 DIAGNOSIS — H81.01 MENIERE'S SYNDROME, RIGHT: ICD-10-CM

## 2019-10-10 PROCEDURE — 92557 COMPREHENSIVE HEARING TEST: CPT | Performed by: AUDIOLOGIST

## 2019-10-10 PROCEDURE — 99214 OFFICE O/P EST MOD 30 MIN: CPT | Mod: 25 | Performed by: OTOLARYNGOLOGY

## 2019-10-10 PROCEDURE — 99999 PR OFFICE/OUTPT VISIT,PROCEDURE ONLY: CPT | Performed by: AUDIOLOGIST

## 2019-10-10 PROCEDURE — 92567 TYMPANOMETRY: CPT | Performed by: AUDIOLOGIST

## 2019-10-10 RX ORDER — AZELASTINE 1 MG/ML
1 SPRAY, METERED NASAL DAILY
Qty: 30 ML | Refills: 5 | Status: SHIPPED | OUTPATIENT
Start: 2019-10-10 | End: 2019-12-19 | Stop reason: ALTCHOICE

## 2019-10-10 RX ORDER — TRIAMTERENE AND HYDROCHLOROTHIAZIDE 37.5; 25 MG/1; MG/1
CAPSULE ORAL
Qty: 30 CAPSULE | Refills: 3 | Status: SHIPPED | OUTPATIENT
Start: 2019-10-10 | End: 2019-12-19 | Stop reason: SDUPTHER

## 2019-10-10 ASSESSMENT — ENCOUNTER SYMPTOMS
DIARRHEA: 0
FATIGUE: 0
WEAKNESS: 0
RHINORRHEA: 1
POLYPHAGIA: 0
VOMITING: 0
DIZZINESS: 1
CONSTIPATION: 0
BACK PAIN: 0
WHEEZING: 0
ARTHRALGIAS: 0
FREQUENCY: 0
DYSPHORIC MOOD: 0
VOICE CHANGE: 0
ADENOPATHY: 0
SINUS PRESSURE: 0
PALPITATIONS: 0
NAUSEA: 1
NERVOUS/ANXIOUS: 0
MYALGIAS: 0
FEVER: 0
SHORTNESS OF BREATH: 0
COUGH: 0
HEADACHES: 0
SLEEP DISTURBANCE: 0
TROUBLE SWALLOWING: 0

## 2019-10-10 NOTE — PROGRESS NOTES
[facility]  [address] [facility] [address], [address]  [place] [address]  Phone: [phone]  Fax: [phone]      Patient ID: Mona Traylor                              : 1990    Visit Date: 10/10/2019    Referring Provider: Valente Patel MD    Chief Complaint: Vertigo      Mona Traylor returned to the [place] office today in regard to ongoing vertigo, tinnitus and hearing loss.    Since her last visit, Mona has been doing considerably better.  Although she has an overarching sensation of dizziness frequently, she is no longer vertiginous on a routine basis and she has been able to work full days through the week.  She has tried to keep the sodium in her diet low and has begun vestibular therapy.  She has had extensive testing in the vestibular clinic and has begun habituation exercises at home.  She will start with the actual therapeutic treatments next week.  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  This will be performed in November.    Right now, Jasmines hearing is still diminished on the right and with that she has tinnitus and a full sensation in the ear.  She also has hyperacusis but that symptom is not severe.    Review of Systems   Constitutional: Negative for fatigue and fever.   HENT: Positive for congestion, hearing loss, postnasal drip, rhinorrhea and tinnitus. Negative for nosebleeds, sinus pressure, trouble swallowing and voice change.         Vertigo.  Hyperacusis.  Ear fullness.   Eyes: Negative for visual disturbance.   Respiratory: Negative for cough, shortness of breath and wheezing.    Cardiovascular: Negative for chest pain and palpitations.   Gastrointestinal: Positive for nausea. Negative for constipation, diarrhea and vomiting.   Endocrine: Negative for polyphagia and polyuria.   Genitourinary: Negative for frequency.   Musculoskeletal: Negative for arthralgias, back pain, gait problem and myalgias.   Skin: Negative for rash.  "  Allergic/Immunologic: Negative for environmental allergies.   Neurological: Positive for dizziness. Negative for weakness and headaches.   Hematological: Negative for adenopathy.   Psychiatric/Behavioral: Negative for dysphoric mood and sleep disturbance. The patient is not nervous/anxious.      Current Medications: Budesonide-formoterol, bupropion xl, jeremi, escitalopram, fluticasone propionate, lorazepam, montelukast, ondansetron odt, polyethylene glycol, proair hfa, and toprol xl.    Physical Exam:  Vitals: /72 (BP Location: Left upper arm, Patient Position: Sitting)   Pulse 84   Ht 1.676 m (5' 6\")   Wt 63.5 kg (140 lb)   SpO2 98%   BMI 22.60 kg/m²   General:  Well-developed, well-nourished 29 y.o. who is in no acute distress.  Voice: Normal without hoarseness, breathiness or stridor.  Head/face:  No scars or lesions.  No parotid masses. No presinus tenderness. Seventh cranial nerves intact.  Eyes:  Extraocular movements and gaze alignment normal.  Ears: Auricles normal.  External auditory canals free of cerumen.  Tympanic membranes clear, mobile and without retraction or scar.  Nose:  Dorsum straight.  Septum midline.  Turbinates normal in size and orientation.  No pus, polyps or crusts.  Oral Cavity/oropharynx: Normal tongue thrust. Normal gag reflex. No masses, leukoplakia, erythroplakia, ulcers or other abnormalities at the tongue, floor of mouth, buccal mucosa, palate or posterior pharyngeal wall.  Larynx/hypopharynx:  Normal supraglottis.  Vocal folds smooth and white.  Arytenoids sharp and mobile.  Interarytenoid and post-glottic mucosa normal.  No masses at the base of tongue, vallecula or piriform sinuses.  Neck:  No masses, adenopathy, cervical spasm or thyroid enlargement.  Cranial Nerves II through XII: Grossly intact.  Lungs: Clear throughout.  Heart: Regular rate and rhythm.  Mental status: Awake, alert and oriented ×3.  No anxiety or depression.    Audiogram: Moderate low-frequency " rising sensorineural hearing loss from 250 to 1000 Hz on the right.  Normal hearing across all frequencies on the left.  Discrimination scores: 96% at 60 dB on the left, 92% at 65 dB on the right.  S2: 78% on the right, 95% on the left.    Tympanogram: Mild negative pressure on the right, normal on the left.      Impression:  1.  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  The patient is tolerating vestibular therapy and therefore this will continue.  In addition, I recommend that she continue on the low-sodium diet and actually begin to take low-dose Dyazide for a while.  2.  Allergic rhinitis.  Mona did not tolerate olopatadine.  We will try Astelin just for comparison.    Recommendations and Plan:  1.  Simply Saline nasal mist, 2 squirts to each nostril twice daily.  2.  Flonase Sensimist, 1 squirt to each nostril each morning.  3.  Astelin, 1 squirt to each nostril each evening.  4.  Keep the sodium in the diet less than 3000 mg/day.  Drink plenty of water.  5.  Dyazide, 1 capsule on Mondays and Thursdays.  This is on a trial basis.  The patient will contact me if she has any untoward side effects.    6.  Follow-up in 2 months.        Krissy Hunter MD

## 2019-10-10 NOTE — PATIENT INSTRUCTIONS
1.  Simply Saline nasal mist, 2 squirts to each nostril twice daily.  2.  Flonase Sensimist, 1 squirt to each nostril each morning.  3.  Astelin, 1 squirt to each nostril each evening.  4.  Keep the sodium in the diet less than 3000 mg/day.  Drink plenty of water.  5.  Dyazide, 1 capsule on Mondays and Thursdays.  This is on a trial basis.  6.  Follow-up in 2 months.

## 2019-10-22 ENCOUNTER — HOSPITAL ENCOUNTER (OUTPATIENT)
Dept: PHYSICAL THERAPY | Facility: HOSPITAL | Age: 29
Setting detail: THERAPIES SERIES
Discharge: HOME | End: 2019-10-22
Attending: OTOLARYNGOLOGY
Payer: COMMERCIAL

## 2019-10-22 DIAGNOSIS — H83.2X1 VESTIBULAR HYPOFUNCTION OF RIGHT EAR: Primary | ICD-10-CM

## 2019-10-22 PROCEDURE — 97530 THERAPEUTIC ACTIVITIES: CPT | Mod: GP | Performed by: PHYSICAL THERAPIST

## 2019-10-22 PROCEDURE — 97112 NEUROMUSCULAR REEDUCATION: CPT | Mod: GP | Performed by: PHYSICAL THERAPIST

## 2019-10-22 NOTE — PROGRESS NOTES
"PT DAILY NOTE FOR OUTPATIENT THERAPY    Patient: Mona Traylor   MRN: [id number]  : 1990 29 y.o.  Referring Physician: Krissy Hunter MD  Date of Visit: 10/22/2019      Certification Dates: 19 through 19    Diagnosis:   1. Vestibular hypofunction of right ear        Chief Complaints:   Chief Complaint   Patient presents with   •  Imbalance   • Other     T[place]        Precautions: fall, other (see comments)      TODAY'S VISIT    General Information - 10/22/19 8217        Session Details    Document Type  daily treatment     Mode of Treatment  physical therapy     Patient/Family/Caregiver Comments/Observations  Pt continues to have trouble fitting HEP into schedule secondary to fear of nausea and increased symptoms with driving and work. Last HEP activity completed approximately 1 wk ago. New audiogram results indicate increased hearing loss right ear.      OP Specialty  Vestibular        Time Calculation    Start Time  1500     Stop Time  1558     Time Calculation (min)  58 min        General Information    Patient Profile Reviewed?  yes     Referring Physician  Dr. Hunter      Pertinent History of Current Functional Problem  Pt woke up in July from a nap and had a severe vertigo attack, got sick with diaphoresis x 30 mins. 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 At first they thought it was a glioma, but then saw a neurosurgeon at [facility], ordered a 2nd MRI. Pt had VNG testing and was diagnosed with either Meniere's or labyrithitis.  Last fall pt had fullness in the R ear. Pt had an audiogram with slight hearing loss of low frequency.  No falls.  The last few days she is feeling better, but started to feel \"off\" in the waiting room. Her eyes have difficulty focusing. No vertigo recently, but imbalance. She stumbles and her eye hand coordination is impaired.  Pt has a hx of anxiety. She has meclizine, but tries not to take it. She also take Zofran more than " the meclizine. Looking at screens, bending over and feeling exerted causes more imbalance.      Existing Precautions/Restrictions  fall;other (see comments)         Pain/Vitals - 10/22/19 1507        Pain Assessment    Currently in pain  No/Denies        Pain Intervention    Intervention   na     Post Intervention Comments  na         Daily Falls Screen - 10/22/19 1512        Daily Falls Assessment    Patient reported fall since last visit  No         Daily Treatment Assessment and Plan - 10/22/19 1653        Daily Treatment Assessment and Plan    Progress toward goals  Progressing     Daily Outcome Summary  Pt experienced imbalance, dizziness and mild nausea with H/V VOR in standing at 105 BPM. Added amb with Med B on goniometer 50 feet x 4 and rockerboard AP/lat 1 min each. Pt challenged with compliant and sway surfaces.      Plan and Recommendations  Advance gaze stabilization ex's as tolerated, balance training and begin habituation training.            OBJECTIVE DATA TAKEN TODAY:    None taken    Today's Treatment:      VESTIBULAR PT FLOWSHEET    PT Vestibular Exercises Current Session Time Done Today   NEURO RE-ED TOTAL TIME FOR SESSION 38-52 Minutes    CP to neck for nausea During BPPV testing N        VOR CANCELLATION     Standing H/VVOR-C     Ambulation w/ H/VVOR-C     VOR / GAZE STABILITY     Seated H/VVOR H/VVOR using med B on goni:  At SSS x 30 sec each (mild dizziness, vertical >horizontal) N   Standing H/VVOR NBOS HVOR using med B on plain wall, tempo 105 BPM x 45 sec then x30 sec (+ imbalance and dizziness and nausea)  NBOS VVOR using med B on plain wall, tempo 105  BPM 2 x 45 sec  (+imbalance and dizziness and nausea) Y   Ambulation w/H/VVOR 50' X 4 H/V VOR at SSV (+ dizziness, + imbalance - nausea)  Y    H/VVOR on compliant surfaces     H/VVOR on sway surfaces     Functional VOR     DVA/GST PTT WNL, DVA WNL, GST WNL both horizontal and vertical N   HABITUATION     Ball circles     Repetitive  functional movements Supine to sit  Nose to knee  Head turns L to R  Head nods up and down  180 deg turns  Wall rolls    MSQ 9/25: 17.6% N   BALANCE- STATIC     On floor     Airex foam WBOS EO/EC x 1 min each  NBOS EO/EC x 1 min each Y   Rockerboard AP / Lat  X 1 min each. CGA  y        SOT 9/19: WNL on IE N   BALANCE- DYNAMIC     Ambulation -head turns/nods/EC     Amb - 180 & 360 degree turns     Retro ambulation EO/EC     Obstacles     FGA 9/25: 27/30 N   OPTOKINETIC STIMULATION          THER-EX  TOTAL TIME FOR SESSION Not performed    CARDIOVASCULAR      Recumbent bike     Treadmill      THER ACT TOTAL TIME FOR SESSION 8-22 Minutes    Patient Education 9/19: results of testing, function of vestibular system  9/25: results of testing, education on the anatomy of vestibular system, education in a HEP (seated VORx1)  10/8: results of testing; advanced HEP to standing using metronome at 105 BPM x 30-45 sec each and standing on pillow in corner EO and EC Y     BPPV: 9/25/19  Test Nystagmus Symptoms Comments   R [place] Hallpike neg + dizziness    L [place] Hallpike neg + dizziness    Deep head hang      Sit to supine neg     Horizontal Roll Test to right neg none    Horizontal Roll Test to left neg none    [place] and Lean (Neutral, Flexion, Extension)         Sidelying test right         Sidelying test left

## 2019-10-22 NOTE — OP PT TREATMENT LOG
VESTIBULAR PT FLOWSHEET    PT Vestibular Exercises Current Session Time Done Today   NEURO RE-ED TOTAL TIME FOR SESSION 38-52 Minutes    CP to neck for nausea During BPPV testing N        VOR CANCELLATION     Standing H/VVOR-C     Ambulation w/ H/VVOR-C     VOR / GAZE STABILITY     Seated H/VVOR H/VVOR using med B on goni:  At SSS x 30 sec each (mild dizziness, vertical >horizontal) N   Standing H/VVOR NBOS HVOR using med B on plain wall, tempo 105 BPM x 45 sec then x30 sec (+ imbalance and dizziness and nausea)  NBOS VVOR using med B on plain wall, tempo 105  BPM 2 x 45 sec  (+imbalance and dizziness and nausea) Y   Ambulation w/H/VVOR 50' X 4 H/V VOR at SSV (+ dizziness, + imbalance - nausea)  Y    H/VVOR on compliant surfaces     H/VVOR on sway surfaces     Functional VOR     DVA/GST PTT WNL, DVA WNL, GST WNL both horizontal and vertical N   HABITUATION     Ball circles     Repetitive functional movements Supine to sit  Nose to knee  Head turns L to R  Head nods up and down  180 deg turns  Wall rolls    MSQ 9/25: 17.6% N   BALANCE- STATIC     On floor     Airex foam WBOS EO/EC x 1 min each  NBOS EO/EC x 1 min each Y   Rockerboard AP / Lat  X 1 min each. CGA  y        SOT 9/19: WNL on IE N   BALANCE- DYNAMIC     Ambulation -head turns/nods/EC     Amb - 180 & 360 degree turns     Retro ambulation EO/EC     Obstacles     FGA 9/25: 27/30 N   OPTOKINETIC STIMULATION          THER-EX  TOTAL TIME FOR SESSION Not performed    CARDIOVASCULAR      Recumbent bike     Treadmill      THER ACT TOTAL TIME FOR SESSION 8-22 Minutes    Patient Education 9/19: results of testing, function of vestibular system  9/25: results of testing, education on the anatomy of vestibular system, education in a HEP (seated VORx1)  10/8: results of testing; advanced HEP to standing using metronome at 105 BPM x 30-45 sec each and standing on pillow in corner EO and EC Y     BPPV: 9/25/19  Test Nystagmus Symptoms Comments   R [place] Hallpike neg +  dizziness    L [place] Hallpike neg + dizziness    Deep head hang      Sit to supine neg     Horizontal Roll Test to right neg none    Horizontal Roll Test to left neg none    [place] and Lean (Neutral, Flexion, Extension)         Sidelying test right         Sidelying test left

## 2019-11-12 ENCOUNTER — TELEPHONE (OUTPATIENT)
Dept: OTOLARYNGOLOGY | Facility: CLINIC | Age: 29
End: 2019-11-12

## 2019-11-12 NOTE — TELEPHONE ENCOUNTER
Patient called will like your opinion on the vestibular therapy you instructed her to do.  Due to cost of her copay she wasn't able to do it every week, she's been doing it every other week.  She doesn't feel its really working; she's seen improvement but really don't believe its due to the therapy.  Patient would like to know is it okay for her to take a break and resume if symptoms get worse.     Patient mentioned she been taking the Dyazide as you instructed, she haven't noticed anything significant improvements but haven't had any negative side affects either.

## 2019-11-12 NOTE — TELEPHONE ENCOUNTER
Since she is tolerating the diuretic she should now take it every other day.  She should obtain a collection of at home exercises for dizziness and imbalance from the physical therapist and perform these twice daily at home if she cannot afford vestibular therapy.  Therapy of this sort takes quite some time and does become pricey.  Therefore she can try to perform the at home exercises but she must be diligent about them doing them every day, usually twice daily.

## 2019-11-26 ENCOUNTER — HOSPITAL ENCOUNTER (OUTPATIENT)
Dept: PHYSICAL THERAPY | Facility: HOSPITAL | Age: 29
Setting detail: THERAPIES SERIES
Discharge: HOME | End: 2019-11-26
Attending: OTOLARYNGOLOGY
Payer: COMMERCIAL

## 2019-11-26 DIAGNOSIS — H83.2X1 VESTIBULAR HYPOFUNCTION OF RIGHT EAR: Primary | ICD-10-CM

## 2019-11-26 PROCEDURE — 97530 THERAPEUTIC ACTIVITIES: CPT | Mod: GP

## 2019-11-26 PROCEDURE — 97112 NEUROMUSCULAR REEDUCATION: CPT | Mod: GP

## 2019-11-26 NOTE — OP PT TREATMENT LOG
VESTIBULAR PT FLOWSHEET     PT Vestibular Exercises Current Session Time Done Today   NEURO RE-ED TOTAL TIME FOR SESSION 38-52 Minutes     CP to neck for nausea During BPPV testing N           VOR CANCELLATION       Standing H/VVOR-C Holding  on goni:  HVOR-C x10 (mild dizziness)  VVOR-C x10 (mild dizziness) Y   Ambulation w/ H/VVOR-C Holding  on goni: 50 ft x2 each  HVOR-C (mod dizziness)  VVOR-C (mod dizziness)  Y   VOR / GAZE STABILITY       Seated H/VVOR H/VVOR using med B on goni:  At SSS x 30 sec each (mild dizziness, vertical >horizontal) N   Standing H/VVOR NBOS HVOR using med B on plain wall, tempo 105 BPM x 45 sec then x30 sec (+ imbalance and dizziness and nausea)  NBOS VVOR using med B on plain wall, tempo 105  BPM 2x 45 sec  (+imbalance and dizziness and nausea) N   Ambulation w/H/VVOR       H/VVOR on compliant surfaces       H/VVOR on sway surfaces       Functional VOR       DVA/GST PTT WNL, DVA WNL, GST WNL both horizontal and vertical N   HABITUATION       Ball circles       Repetitive functional movements Supine to sit  Nose to knee  Head turns L to R  Head nods up and down  180 deg turns  Wall rolls Y   MSQ 9/25: 17.6%  11/26: 4% Y   BALANCE- STATIC       On floor       Airex foam WBOS EO/EC x 1 min each  NBOS EO/EC x 1 min each N   Rockerboard               SOT 9/19: WNL on IE N   BALANCE- DYNAMIC       Ambulation -head turns/nods/EC  Y   Amb - 180 & 360 degree turns Amb 5 steps then 180 deg turn 50 ftx2  Amb 3 steps then 180 deg turn 50 ftx2  Amb 5 steps then 360 deg turn 50 ftx2  Amb 3 steps then 360 deg turn 50 ftx2  Y   Retro ambulation EO/EC       Obstacles       FGA 9/25: 27/30 N   OPTOKINETIC STIMULATION               THER-EX  TOTAL TIME FOR SESSION Not performed     CARDIOVASCULAR        Recumbent bike       Treadmill        THER ACT TOTAL TIME FOR SESSION 8-22 Minutes     Patient Education 9/19: results of testing, function of vestibular system  9/25: results of testing, education  on the anatomy of vestibular system, education in a HEP (seated VORx1)  10/8: results of testing; advanced HEP to standing using metronome at 105 BPM x 30-45 sec each and standing on pillow in corner EO and EC  11/26: updated HEP to include sitting or standing VOR-C then progress to walking VOR-C by hallway wall, head turns/nods then progress to walking head turns/nods, nose to knee habituation Y      BPPV: 9/25/19  Test Nystagmus Symptoms Comments   R [place] Hallpike neg + dizziness     L [place] Hallpike neg + dizziness     Deep head hang         Sit to supine neg       Horizontal Roll Test to right neg none     Horizontal Roll Test to left neg none     [place] and Lean (Neutral, Flexion, Extension)         Sidelying test right         Sidelying test left

## 2019-11-27 NOTE — PROGRESS NOTES
PT PROGRESS NOTE FOR OUTPATIENT THERAPY    Patient: Mona Traylor   MRN: [id number]  : 1990 29 y.o.  Referring Physician: Krissy Hunter MD  Date of Visit: 2019      Certification Dates: 19 through 19    Recommended Frequency & Duration:  1 time/week for up to 3 months     Diagnosis:   1. Vestibular hypofunction of right ear        Chief Complaints:   Chief Complaint   Patient presents with   • Balance Deficits   • Dizziness       Precautions: fall, other (see comments)(dizziness and tinnitus)    TODAY'S VISIT:    General Information - 19 170        Session Details    Document Type  progress note     Mode of Treatment  physical therapy     Patient/Family/Caregiver Comments/Observations  Pt reports has noticed periods of improvement, but has down sides. Pt has not been as compliant as she should be with her HEP. Pt still has hyperacusis even though she has tinnitus. Pt had an episode a week or 2 ago and experienced room moving dizziness when trying to go to sleep. She rolled on her side and it went away.  Dizziness is 1.5/5.      OP Specialty  Vestibular        Time Calculation    Start Time  1701     Stop Time  1758     Time Calculation (min)  57 min        General Information    Patient Profile Reviewed?  yes     Referring Physician  Dr Hunter     Existing Precautions/Restrictions  fall;other (see comments)    dizziness and tinnitus        Pain/Vitals - 19 1708        Pain Assessment    Currently in pain  No/Denies        Pain Intervention    Intervention   N/A     Post Intervention Comments  N/A         Daily Falls Screen - 19 1708        Daily Falls Assessment    Patient reported fall since last visit  No         Daily Treatment Assessment and Plan - 19 8115        Daily Treatment Assessment and Plan    Progress toward goals  Slower than expected     Daily Outcome Summary  Pt admits to not being compliant with her HEP as she should and as she knows she  should. Focused on habituation training today and updating her HEP. MSQ was re-assessed today and improved from 17.6% to 4%, now mildly motion sensitive. Pt was advised by Dr Hunter to continue PT longer since progress will likely be slow.      Plan and Recommendations  Advance gaze stabilization if symptomatic, VOR-C training and habituation ex;s as tolerated.            OBJECTIVE MEASUREMENTS/DATA:    Vestibular    PT Vestibular Evaluation - 11/26/19 1800        Motion Sensitivity    Motion Sensitivity Quotient Percentage (%)  4 percent     Motion Sensitivity number of provoking positions  5        Other Outcome Measures    Dizziness Handicap Inventory Score  48           Today's Treatment::      VESTIBULAR PT FLOWSHEET     PT Vestibular Exercises Current Session Time Done Today   NEURO RE-ED TOTAL TIME FOR SESSION 38-52 Minutes     CP to neck for nausea During BPPV testing N           VOR CANCELLATION       Standing H/VVOR-C Holding Peter on goni:  HVOR-C x10 (mild dizziness)  VVOR-C x10 (mild dizziness) Y   Ambulation w/ H/VVOR-C Holding Peter on goni: 50 ft x2 each  HVOR-C (mod dizziness)  VVOR-C (mod dizziness)  Y   VOR / GAZE STABILITY       Seated H/VVOR H/VVOR using med B on goni:  At SSS x 30 sec each (mild dizziness, vertical >horizontal) N   Standing H/VVOR NBOS HVOR using med B on plain wall, tempo 105 BPM x 45 sec then x30 sec (+ imbalance and dizziness and nausea)  NBOS VVOR using med B on plain wall, tempo 105  BPM 2x 45 sec  (+imbalance and dizziness and nausea) N   Ambulation w/H/VVOR       H/VVOR on compliant surfaces       H/VVOR on sway surfaces       Functional VOR       DVA/GST PTT WNL, DVA WNL, GST WNL both horizontal and vertical N   HABITUATION       Ball circles       Repetitive functional movements Supine to sit  Nose to knee  Head turns L to R  Head nods up and down  180 deg turns  Wall rolls Y   MSQ 9/25: 17.6%  11/26: 4% Y   BALANCE- STATIC       On floor       Airex foam WBOS EO/EC x  1 min each  NBOS EO/EC x 1 min each N   Rockerboard               SOT 9/19: WNL on IE N   BALANCE- DYNAMIC       Ambulation -head turns/nods/EC  Y   Amb - 180 & 360 degree turns Amb 5 steps then 180 deg turn 50 ftx2  Amb 3 steps then 180 deg turn 50 ftx2  Amb 5 steps then 360 deg turn 50 ftx2  Amb 3 steps then 360 deg turn 50 ftx2  Y   Retro ambulation EO/EC       Obstacles       FGA 9/25: 27/30 N   OPTOKINETIC STIMULATION               THER-EX  TOTAL TIME FOR SESSION Not performed     CARDIOVASCULAR        Recumbent bike       Treadmill        THER ACT TOTAL TIME FOR SESSION 8-22 Minutes     Patient Education 9/19: results of testing, function of vestibular system  9/25: results of testing, education on the anatomy of vestibular system, education in a HEP (seated VORx1)  10/8: results of testing; advanced HEP to standing using metronome at 105 BPM x 30-45 sec each and standing on pillow in corner EO and EC  11/26: updated HEP to include sitting or standing VOR-C then progress to walking VOR-C by hallway wall, head turns/nods then progress to walking head turns/nods, nose to knee habituation Y      BPPV: 9/25/19  Test Nystagmus Symptoms Comments   R [place] Hallpike neg + dizziness     L [place] Hallpike neg + dizziness     Deep head hang         Sit to supine neg       Horizontal Roll Test to right neg none     Horizontal Roll Test to left neg none     [place] and Lean (Neutral, Flexion, Extension)         Sidelying test right         Sidelying test left                   Goals Addressed                 This Visit's Progress    • Vestibular Goals        Short Term Goals Time Frame Result Comment/Progress   Patient will decrease Motion Sensitivity Quotient to TBA for increased functional tolerance.   4-6 weeks met    Patient will increase FGA score to TBA for increased gait stability and balance.   4-6 weeks met    Patient will improve DVA (Dynamic Visual Acuity) to </= TBA for functional improved gaze stability. 4-6 weeks met     Patient will achieve TBA on GST (Gaze Stability Test) for improved gaze stability.   4-6 weeks met    Patient will perform home exercise program with supervision.   4-6 weeks met    Patient will tolerate 10 minutes of cardiovascular conditioning. 4-6 weeks     Patient will negotiate curbs/ramps/uneven surfaces without LOB. 4-6 weeks     Patient will have negative BPPV all canals for symptom-free functional mobility. 4-6 weeks met              Long Term Goals Time Frame Result Comment/Progress   Motion Sensitivity Quotient to <2% for increased functional tolerance.   8-12 weeks ongoing 17.6%  11/26: 4%   Patient will increase FGA score to >24/30 for increased gait stability, safety and functional mobility.   8-12 weeks met 27/30   Patient will decrease DVA to < or =2 lines for functional improved gaze stability. 8-12 weeks met    Patient will increase GST scores to >120 deg/sec for improved gaze stability.   8-12 weeks met    Patient will perform home exercise program independently.   8-12 weeks     Patient will tolerate 15 minutes of cardiovascular conditioning. 8-12 weeks     Patient will ambulate in community without LOB and without dizziness. 8-12 weeks     Patient will have negative BPPV all canals for symptom-free functional mobility. 8-12 weeks met    Patient will return to (driving / work / school / sports).   8-12 weeks     Patient will improve score on [place] to WFL for decreased report of symptoms with daily activities. 8-12 weeks  11/26: DHI 48%   Patient will have no increased symptoms with challenging VOR activities for symptom free high level activities.   8-12 weeks

## 2019-12-04 ENCOUNTER — HOSPITAL ENCOUNTER (OUTPATIENT)
Dept: PHYSICAL THERAPY | Facility: HOSPITAL | Age: 29
Setting detail: THERAPIES SERIES
Discharge: HOME | End: 2019-12-04
Attending: OTOLARYNGOLOGY
Payer: COMMERCIAL

## 2019-12-04 DIAGNOSIS — H83.2X1 VESTIBULAR HYPOFUNCTION OF RIGHT EAR: Primary | ICD-10-CM

## 2019-12-04 PROCEDURE — 97112 NEUROMUSCULAR REEDUCATION: CPT | Mod: GP

## 2019-12-04 NOTE — PROGRESS NOTES
PT DAILY NOTE FOR OUTPATIENT THERAPY    Patient: Mona Traylor   MRN: [id number]  : 1990 29 y.o.  Referring Physician: Krissy Hunter MD  Date of Visit: 2019      Certification Dates: 19 through 19    Diagnosis:   1. Vestibular hypofunction of right ear        Chief Complaints:   Chief Complaint   Patient presents with   • Dizziness       Precautions: fall, other (see comments)(dizziness and tinnitus)      TODAY'S VISIT    General Information - 19 1710        Session Details    Document Type  daily treatment     Mode of Treatment  physical therapy     Patient/Family/Caregiver Comments/Observations  Pt reports she has felt better lately. She states she is still having some episodes of dizziness, but they are not interfering with her ability to function. She states symptoms are worse with quick head movements.  She has been performing HEP inconsistently.      OP Specialty  Vestibular        Time Calculation    Start Time  1707     Stop Time  1805     Time Calculation (min)  58 min        General Information    Patient Profile Reviewed?  yes     Existing Precautions/Restrictions  fall;other (see comments)    dizziness and tinnitus        Pain/Vitals - 19 1705        Pain Assessment    Currently in pain  No/Denies        Pain Intervention    Intervention   NA     Post Intervention Comments  NA         Daily Falls Screen - 19 1711        Daily Falls Assessment    Patient reported fall since last visit  No         Daily Treatment Assessment and Plan - 19 1806        Daily Treatment Assessment and Plan    Progress toward goals  Progressing     Daily Outcome Summary  Session began with HVOR with [employer]. Pt was able to complete 45 seconds. She was unable to keep time with metronome and it was decreased from 105 to 90. Pt rates symptoms as 5/10 after completing the exercise. Symptoms took approx. 5 mins to resolve. Pt then states she has had more fullness and tinnitus  with sensitivity to loud noises over the last few days. She has another audiogram scheduled on 12/19. She reports similar symptoms after VVOR for 40 seconds which improved after 3-4 minutes standing rest break. She tolerated VOR-C in standing and with amb with minimal symptoms that resolved fairly quickly. Amb with head turns and head nods was tolerated with minimal symptoms. Ball toss was added to program with pt reporting 5/10 symptoms and requiring seated rest break.      Plan and Recommendations  Advance gaze stabilization and habituation exercises as tolerated. cont to promote IND with HEP.            OBJECTIVE DATA TAKEN TODAY:    None taken    Today's Treatment:      VESTIBULAR PT FLOWSHEET     PT Vestibular Exercises Current Session Time Done Today   NEURO RE-ED TOTAL TIME FOR SESSION 53-67 Minutes     CP to neck for nausea During BPPV testing N           VOR CANCELLATION       Standing H/VVOR-C Holding Peter on goni:  HVOR-C x10 (mild dizziness)  VVOR-C x10 (mild dizziness)   Yes  yes   Ambulation w/ H/VVOR-C Holding Peter on goni: 50 ft x2 each  HVOR-C (mod dizziness)  VVOR-C (mod dizziness)    Yes  yes   VOR / GAZE STABILITY       Seated H/VVOR H/VVOR using med B on goni:  At SSS x 30 sec each (mild dizziness, vertical >horizontal) N   Standing H/VVOR NBOS HVOR using med B on plain wall, tempo 95 BPM x 45 sec  (+ imbalance and dizziness, rated 5/10)  NBOS VVOR using med B on plain wall, tempo 90 BPM 1x 40 sec  (+imbalance and dizziness 5-6/10 ) Yes      yes   Ambulation w/H/VVOR       H/VVOR on compliant surfaces       H/VVOR on sway surfaces       Functional VOR       DVA/GST PTT WNL, DVA WNL, GST WNL both horizontal and vertical N   HABITUATION       Ball circles       Repetitive functional movements Supine to sit  Nose to knee  Head turns L to R  Head nods up and down  180 deg turns  Wall rolls  Ball toss/catch from R to L and L to R x 10 5/10 symptoms after each  n            yes   MSQ 9/25:  17.6%  11/26: 4% n   BALANCE- STATIC       On floor       Airex foam WBOS EO/EC x 1 min each  NBOS EO/EC x 1 min each N   Rockerboard               SOT 9/19: WNL on IE N   BALANCE- DYNAMIC       Ambulation -head turns/nods/EC amb with head turns 50 ft x 2 q 3 steps  amb with head nods 50 ft x 2 q 3 steps  amb with diagonal head turns 50 ft x 2 q 3 steps.  Yes  Yes  yes   Amb - 180 & 360 degree turns Amb 5 steps then 180 deg turn 50 ftx2  Amb 3 steps then 180 deg turn 50 ftx2  Amb 5 steps then 360 deg turn 50 ftx2  Amb 3 steps then 360 deg turn 50 ftx2  No  Yes  Yes  no   Retro ambulation EO/EC       Obstacles       FGA 9/25: 27/30 N   OPTOKINETIC STIMULATION               THER-EX  TOTAL TIME FOR SESSION Not performed     CARDIOVASCULAR        Recumbent bike       Treadmill        THER ACT TOTAL TIME FOR SESSION Not performed     Patient Education 9/19: results of testing, function of vestibular system  9/25: results of testing, education on the anatomy of vestibular system, education in a HEP (seated VORx1)  10/8: results of testing; advanced HEP to standing using metronome at 105 BPM x 30-45 sec each and standing on pillow in corner EO and EC  11/26: updated HEP to include sitting or standing VOR-C then progress to walking VOR-C by hallway wall, head turns/nods then progress to walking head turns/nods, nose to knee habituation n      BPPV: 9/25/19  Test Nystagmus Symptoms Comments   R [place] Hallpike neg + dizziness     L [place] Hallpike neg + dizziness     Deep head hang         Sit to supine neg       Horizontal Roll Test to right neg none     Horizontal Roll Test to left neg none     [place] and Lean (Neutral, Flexion, Extension)         Sidelying test right         Sidelying test left

## 2019-12-04 NOTE — OP PT TREATMENT LOG
VESTIBULAR PT FLOWSHEET     PT Vestibular Exercises Current Session Time Done Today   NEURO RE-ED TOTAL TIME FOR SESSION 53-67 Minutes     CP to neck for nausea During BPPV testing N           VOR CANCELLATION       Standing H/VVOR-C Holding  on goni:  HVOR-C x10 (mild dizziness)  VVOR-C x10 (mild dizziness)   Yes  yes   Ambulation w/ H/VVOR-C Holding  on goni: 50 ft x2 each  HVOR-C (mod dizziness)  VVOR-C (mod dizziness)    Yes  yes   VOR / GAZE STABILITY       Seated H/VVOR H/VVOR using med B on goni:  At SSS x 30 sec each (mild dizziness, vertical >horizontal) N   Standing H/VVOR NBOS HVOR using med B on plain wall, tempo 95 BPM x 45 sec  (+ imbalance and dizziness, rated 5/10)  NBOS VVOR using med B on plain wall, tempo 90 BPM 1x 40 sec  (+imbalance and dizziness 5-6/10 ) Yes      yes   Ambulation w/H/VVOR       H/VVOR on compliant surfaces       H/VVOR on sway surfaces       Functional VOR       DVA/GST PTT WNL, DVA WNL, GST WNL both horizontal and vertical N   HABITUATION       Ball circles       Repetitive functional movements Supine to sit  Nose to knee  Head turns L to R  Head nods up and down  180 deg turns  Wall rolls  Ball toss/catch from R to L and L to R x 10 5/10 symptoms after each  n            yes   MSQ 9/25: 17.6%  11/26: 4% n   BALANCE- STATIC       On floor       Airex foam WBOS EO/EC x 1 min each  NBOS EO/EC x 1 min each N   Rockerboard               SOT 9/19: WNL on IE N   BALANCE- DYNAMIC       Ambulation -head turns/nods/EC amb with head turns 50 ft x 2 q 3 steps  amb with head nods 50 ft x 2 q 3 steps  amb with diagonal head turns 50 ft x 2 q 3 steps.  Yes  Yes  yes   Amb - 180 & 360 degree turns Amb 5 steps then 180 deg turn 50 ftx2  Amb 3 steps then 180 deg turn 50 ftx2  Amb 5 steps then 360 deg turn 50 ftx2  Amb 3 steps then 360 deg turn 50 ftx2  No  Yes  Yes  no   Retro ambulation EO/EC       Obstacles       FGA 9/25: 27/30 N   OPTOKINETIC STIMULATION               THER-EX   TOTAL TIME FOR SESSION Not performed     CARDIOVASCULAR        Recumbent bike       Treadmill        THER ACT TOTAL TIME FOR SESSION Not performed     Patient Education 9/19: results of testing, function of vestibular system  9/25: results of testing, education on the anatomy of vestibular system, education in a HEP (seated VORx1)  10/8: results of testing; advanced HEP to standing using metronome at 105 BPM x 30-45 sec each and standing on pillow in corner EO and EC  11/26: updated HEP to include sitting or standing VOR-C then progress to walking VOR-C by hallway wall, head turns/nods then progress to walking head turns/nods, nose to knee habituation n      BPPV: 9/25/19  Test Nystagmus Symptoms Comments   R [place] Hallpike neg + dizziness     L [place] Hallpike neg + dizziness     Deep head hang         Sit to supine neg       Horizontal Roll Test to right neg none     Horizontal Roll Test to left neg none     [place] and Lean (Neutral, Flexion, Extension)         Sidelying test right         Sidelying test left

## 2019-12-12 ENCOUNTER — HOSPITAL ENCOUNTER (OUTPATIENT)
Dept: PHYSICAL THERAPY | Facility: HOSPITAL | Age: 29
Setting detail: THERAPIES SERIES
Discharge: HOME | End: 2019-12-12
Attending: OTOLARYNGOLOGY
Payer: COMMERCIAL

## 2019-12-12 DIAGNOSIS — H83.2X1 VESTIBULAR HYPOFUNCTION OF RIGHT EAR: Primary | ICD-10-CM

## 2019-12-12 PROCEDURE — 97112 NEUROMUSCULAR REEDUCATION: CPT | Mod: GP

## 2019-12-12 PROCEDURE — 97530 THERAPEUTIC ACTIVITIES: CPT | Mod: GP

## 2019-12-12 NOTE — OP PT TREATMENT LOG
VESTIBULAR PT FLOWSHEET     PT Vestibular Exercises Current Session Time Done Today   NEURO RE-ED TOTAL TIME FOR SESSION 38-52 Minutes     CP to neck for nausea During BPPV testing N           VOR CANCELLATION       Standing H/VVOR-C Holding  on goni:  HVOR-C x10 (mild dizziness)  VVOR-C x10 (mild dizziness)   Yes  yes   Ambulation w/ H/VVOR-C Holding  on goni: 50 ft x2 each  HVOR-C (mod dizziness)  VVOR-C (mod dizziness)    Yes  yes   VOR / GAZE STABILITY       Seated H/VVOR H/VVOR using med B on goni:  At SSS x 30 sec each (mild dizziness, vertical >horizontal) N   Standing H/VVOR NBOS HVOR using med B on plain wall, tempo 90 BPM 2x 45 sec  (+ imbalance and dizziness, rated 3.5 then 3/5)  NBOS VVOR using med B on plain wall, tempo 90 BPM 2x 45 sec  (+imbalance and dizziness 2.5/5 ) Yes        yes   Ambulation w/H/VVOR       H/VVOR on compliant surfaces       H/VVOR on sway surfaces       Functional VOR       DVA/GST PTT WNL, DVA WNL, GST WNL both horizontal and vertical N   HABITUATION       Ball circles       Repetitive functional movements Supine to sit  Nose to knee  Head turns L to R  Head nods up and down  180 deg turns  Wall rolls  Ball toss/catch from R to L and L to R x 10 5/10 symptoms after each  n            yes   MSQ 9/25: 17.6%  11/26: 4% n   BALANCE- STATIC       On floor       Airex foam WBOS EO/EC x 1 min each  NBOS EO/EC x 1 min each N   Rockerboard               SOT 9/19: WNL on IE N   BALANCE- DYNAMIC       Ambulation -head turns/nods/EC amb with head turns 50 ft x 4 q 3 steps  amb with head nods 50 ft x 4 q 3 steps  amb with diagonal head turns 50 ft x 2 q 3 steps.  Y  Y  N   Amb - 180 & 360 degree turns Amb 5 steps then 180 deg turn 50 ftx2  Amb 3 steps then 180 deg turn 50 ftx4  Amb 5 steps then 360 deg turn 50 ftx2  Amb 3 steps then 360 deg turn 50 ftx2  No  Yes  No  Yes   Walking with ball toss/catch to PT lateral to pt 50 ftx4 to the L then R Y   Walk with ball toss/catch to PT  behind pt 50 ftx4 Y   Retro ambulation EO/EC       Obstacles       FGA 9/25: 27/30 N   OPTOKINETIC STIMULATION               THER-EX  TOTAL TIME FOR SESSION Not performed     CARDIOVASCULAR        Recumbent bike       Treadmill        THER ACT TOTAL TIME FOR SESSION 8-22 min     Patient Education 9/19: results of testing, function of vestibular system  9/25: results of testing, education on the anatomy of vestibular system, education in a HEP (seated VORx1)  10/8: results of testing; advanced HEP to standing using metronome at 105 BPM x 30-45 sec each and standing on pillow in corner EO and EC  11/26: updated HEP to include sitting or standing VOR-C then progress to walking VOR-C by hallway wall, head turns/nods then progress to walking head turns/nods, nose to knee habituation  12/12: reviewed pain, meds, HEP n      BPPV: 9/25/19  Test Nystagmus Symptoms Comments   R [place] Hallpike neg + dizziness     L [place] Hallpike neg + dizziness     Deep head hang         Sit to supine neg       Horizontal Roll Test to right neg none     Horizontal Roll Test to left neg none     [place] and Lean (Neutral, Flexion, Extension)         Sidelying test right         Sidelying test left

## 2019-12-13 NOTE — PROGRESS NOTES
PT DAILY NOTE FOR OUTPATIENT THERAPY    Patient: Mona Traylor   MRN: [id number]  : 1990 29 y.o.  Referring Physician: Krissy Hunter MD  Date of Visit: 2019      Certification Dates: 19 through 19    Diagnosis:   1. Vestibular hypofunction of right ear        Chief Complaints:   Chief Complaint   Patient presents with   • Difficulty Walking   • Balance Deficits   • Dizziness   • Decreased Mobility       Precautions: fall, other (see comments)(dizziness)      TODAY'S VISIT    General Information - 19 1706        Session Details    Document Type  daily treatment     Mode of Treatment  physical therapy     Patient/Family/Caregiver Comments/Observations  Pt has not been sleeping well and has had a few bouts of dizziness today. Pt is not feeling that well today. Pt has an appt with Dr Hunter on  along with an audiogram.      OP Specialty  Vestibular        Time Calculation    Start Time  1705     Stop Time  1800     Time Calculation (min)  55 min        General Information    Patient Profile Reviewed?  yes     Referring Physician  Dr Hunter     Existing Precautions/Restrictions  fall;other (see comments)    dizziness        Pain/Vitals - 19 1706        Pain Assessment    Currently in pain  Yes     Preferred Pain Scale  number (Numeric Rating Pain Scale)     Pain: Body location  Head     Pain Rating (0-10): Pre Activity  3     Pain Rating (0-10): Post Activity  3        Pain Intervention    Intervention   none     Post Intervention Comments  none         Daily Falls Screen - 19 1708        Daily Falls Assessment    Patient reported fall since last visit  No         Daily Treatment Assessment and Plan - 19 1003        Daily Treatment Assessment and Plan    Progress toward goals  Progressing     Daily Outcome Summary  Pt continues to report not being as compliant with her HEP as she should be. She continues to c/o dizziness intermittently with quick head  turns, but is demonstrating quicker recovery periods. Advanced habituation training including walking and tossing ball from either side lateral to PT then post to PT.      Plan and Recommendations  Advanced gaze stabilization and habituation training as tolerated. Re-eval next session. Cont to enforce compliance with HEP.            OBJECTIVE DATA TAKEN TODAY:    None taken    Today's Treatment:      VESTIBULAR PT FLOWSHEET     PT Vestibular Exercises Current Session Time Done Today   NEURO RE-ED TOTAL TIME FOR SESSION 38-52 Minutes     CP to neck for nausea During BPPV testing N           VOR CANCELLATION       Standing H/VVOR-C Holding Peter on goni:  HVOR-C x10 (mild dizziness)  VVOR-C x10 (mild dizziness)   Yes  yes   Ambulation w/ H/VVOR-C Holding Peter on goni: 50 ft x2 each  HVOR-C (mod dizziness)  VVOR-C (mod dizziness)    Yes  yes   VOR / GAZE STABILITY       Seated H/VVOR H/VVOR using med B on goni:  At SSS x 30 sec each (mild dizziness, vertical >horizontal) N   Standing H/VVOR NBOS HVOR using med B on plain wall, tempo 90 BPM 2x 45 sec  (+ imbalance and dizziness, rated 3.5 then 3/5)  NBOS VVOR using med B on plain wall, tempo 90 BPM 2x 45 sec  (+imbalance and dizziness 2.5/5 ) Yes        yes   Ambulation w/H/VVOR       H/VVOR on compliant surfaces       H/VVOR on sway surfaces       Functional VOR       DVA/GST PTT WNL, DVA WNL, GST WNL both horizontal and vertical N   HABITUATION       Ball circles       Repetitive functional movements Supine to sit  Nose to knee  Head turns L to R  Head nods up and down  180 deg turns  Wall rolls  Ball toss/catch from R to L and L to R x 10 5/10 symptoms after each  n            yes   MSQ 9/25: 17.6%  11/26: 4% n   BALANCE- STATIC       On floor       Airex foam WBOS EO/EC x 1 min each  NBOS EO/EC x 1 min each N   Rockerboard               SOT 9/19: WNL on IE N   BALANCE- DYNAMIC       Ambulation -head turns/nods/EC amb with head turns 50 ft x 4 q 3 steps  amb with head  nods 50 ft x 4 q 3 steps  amb with diagonal head turns 50 ft x 2 q 3 steps.  Y  Y  N   Amb - 180 & 360 degree turns Amb 5 steps then 180 deg turn 50 ftx2  Amb 3 steps then 180 deg turn 50 ftx4  Amb 5 steps then 360 deg turn 50 ftx2  Amb 3 steps then 360 deg turn 50 ftx2  No  Yes  No  Yes   Walking with ball toss/catch to PT lateral to pt 50 ftx4 to the L then R Y   Walk with ball toss/catch to PT behind pt 50 ftx4 Y   Retro ambulation EO/EC       Obstacles       FGA 9/25: 27/30 N   OPTOKINETIC STIMULATION               THER-EX  TOTAL TIME FOR SESSION Not performed     CARDIOVASCULAR        Recumbent bike       Treadmill        THER ACT TOTAL TIME FOR SESSION 8-22 min     Patient Education 9/19: results of testing, function of vestibular system  9/25: results of testing, education on the anatomy of vestibular system, education in a HEP (seated VORx1)  10/8: results of testing; advanced HEP to standing using metronome at 105 BPM x 30-45 sec each and standing on pillow in corner EO and EC  11/26: updated HEP to include sitting or standing VOR-C then progress to walking VOR-C by hallway wall, head turns/nods then progress to walking head turns/nods, nose to knee habituation  12/12: reviewed pain, meds, HEP n      BPPV: 9/25/19  Test Nystagmus Symptoms Comments   R [place] Hallpike neg + dizziness     L [place] Hallpike neg + dizziness     Deep head hang         Sit to supine neg       Horizontal Roll Test to right neg none     Horizontal Roll Test to left neg none     [place] and Lean (Neutral, Flexion, Extension)         Sidelying test right         Sidelying test left

## 2019-12-19 ENCOUNTER — HOSPITAL ENCOUNTER (OUTPATIENT)
Dept: PHYSICAL THERAPY | Facility: HOSPITAL | Age: 29
Setting detail: THERAPIES SERIES
Discharge: HOME | End: 2019-12-19
Attending: OTOLARYNGOLOGY
Payer: COMMERCIAL

## 2019-12-19 ENCOUNTER — OFFICE VISIT (OUTPATIENT)
Dept: OTOLARYNGOLOGY | Facility: CLINIC | Age: 29
End: 2019-12-19
Payer: COMMERCIAL

## 2019-12-19 ENCOUNTER — PROCEDURE VISIT (OUTPATIENT)
Dept: OTOLARYNGOLOGY | Facility: CLINIC | Age: 29
End: 2019-12-19
Payer: COMMERCIAL

## 2019-12-19 VITALS
WEIGHT: 140 LBS | SYSTOLIC BLOOD PRESSURE: 102 MMHG | HEART RATE: 97 BPM | DIASTOLIC BLOOD PRESSURE: 74 MMHG | OXYGEN SATURATION: 99 % | BODY MASS INDEX: 22.5 KG/M2 | HEIGHT: 66 IN

## 2019-12-19 DIAGNOSIS — H93.11 SUBJECTIVE TINNITUS OF RIGHT EAR: ICD-10-CM

## 2019-12-19 DIAGNOSIS — H93.11 TINNITUS OF RIGHT EAR: ICD-10-CM

## 2019-12-19 DIAGNOSIS — H81.01 ACTIVE COCHLEAR MENIERE'S DISEASE OF RIGHT EAR: Primary | ICD-10-CM

## 2019-12-19 DIAGNOSIS — H90.41 SENSORINEURAL HEARING LOSS (SNHL) OF RIGHT EAR WITH UNRESTRICTED HEARING OF LEFT EAR: ICD-10-CM

## 2019-12-19 DIAGNOSIS — H90.41 SENSORINEURAL HEARING LOSS (SNHL) OF RIGHT EAR WITH UNRESTRICTED HEARING OF LEFT EAR: Primary | ICD-10-CM

## 2019-12-19 DIAGNOSIS — R42 DIZZINESS: ICD-10-CM

## 2019-12-19 DIAGNOSIS — H83.2X1 VESTIBULAR HYPOFUNCTION OF RIGHT EAR: Primary | ICD-10-CM

## 2019-12-19 PROBLEM — H90.42 SENSORINEURAL HEARING LOSS (SNHL) OF LEFT EAR WITH UNRESTRICTED HEARING OF RIGHT EAR: Status: RESOLVED | Noted: 2019-12-19 | Resolved: 2019-12-19

## 2019-12-19 PROBLEM — H81.02 ACTIVE COCHLEAR MENIERE'S DISEASE OF LEFT EAR: Status: ACTIVE | Noted: 2019-12-19

## 2019-12-19 PROBLEM — H90.42 SENSORINEURAL HEARING LOSS (SNHL) OF LEFT EAR WITH UNRESTRICTED HEARING OF RIGHT EAR: Status: ACTIVE | Noted: 2019-12-19

## 2019-12-19 PROCEDURE — 92567 TYMPANOMETRY: CPT | Performed by: AUDIOLOGIST

## 2019-12-19 PROCEDURE — 97530 THERAPEUTIC ACTIVITIES: CPT | Mod: GP

## 2019-12-19 PROCEDURE — 99999 PR OFFICE/OUTPT VISIT,PROCEDURE ONLY: CPT | Performed by: AUDIOLOGIST

## 2019-12-19 PROCEDURE — 92557 COMPREHENSIVE HEARING TEST: CPT | Performed by: AUDIOLOGIST

## 2019-12-19 PROCEDURE — 97112 NEUROMUSCULAR REEDUCATION: CPT | Mod: GP

## 2019-12-19 PROCEDURE — 99214 OFFICE O/P EST MOD 30 MIN: CPT | Mod: 25 | Performed by: OTOLARYNGOLOGY

## 2019-12-19 RX ORDER — TRIAMTERENE AND HYDROCHLOROTHIAZIDE 37.5; 25 MG/1; MG/1
CAPSULE ORAL
Qty: 90 CAPSULE | Refills: 3 | Status: SHIPPED | OUTPATIENT
Start: 2019-12-19 | End: 2020-11-03 | Stop reason: SDUPTHER

## 2019-12-19 RX ORDER — AZELASTINE 1 MG/ML
1 SPRAY, METERED NASAL 2 TIMES DAILY
COMMUNITY
End: 2020-07-28

## 2019-12-19 ASSESSMENT — ENCOUNTER SYMPTOMS
ADENOPATHY: 0
VOICE CHANGE: 0
SHORTNESS OF BREATH: 0
ARTHRALGIAS: 0
WHEEZING: 0
HEADACHES: 1
NAUSEA: 0
POLYPHAGIA: 0
FEVER: 0
PALPITATIONS: 0
DIZZINESS: 1
WEAKNESS: 0
FATIGUE: 0
MYALGIAS: 0
SLEEP DISTURBANCE: 0
SINUS PRESSURE: 0
DYSPHORIC MOOD: 0
FREQUENCY: 0
DIARRHEA: 0
RHINORRHEA: 0
COUGH: 0
BACK PAIN: 0
VOMITING: 0
CONSTIPATION: 0
TROUBLE SWALLOWING: 0
NERVOUS/ANXIOUS: 1

## 2019-12-19 NOTE — PATIENT INSTRUCTIONS
1.  Monitor the sodium closely.  You must keep your diet less than 3000 mg/day.  2.  Drink more water.  At least 8 glasses of water per day.  Avoid caffeine.  3.  Dyazide, 1 tablet daily.  4.  Continue the vestibular exercises if they are helping.  5.  Follow-up in 3 months.

## 2019-12-19 NOTE — PROGRESS NOTES
[facility]  [address], [address]  [place] [address]  Phone: [phone]  Fax: [phone]      Patient ID: Mona Traylor                              : 1990    Visit Date: 2019  Referring Provider: Yadira Trujillo MD    Chief Complaint: SNHL      Mona Traylor returned to the [place] office today in regard to her history of vertigo, tinnitus and sensorineural hearing loss.    I last saw Mona in October.  She continues with hearing loss and tinnitus on the right as well as disequilibrium.  Her vertigo is not as severe as it had been but she continues with episodes of dizziness.  She admits to not fully following proper sodium precautions.  She is taking Dyazide every other day.  She had started vestibular therapy at that time and continues to participate does not perform at home exercises.  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    Inocencio has not had a upper respiratory infection but does have some mild postnasal drainage for which she is using Flonase and Astelin.    Review of Systems   Constitutional: Negative for fatigue and fever.   HENT: Positive for hearing loss, postnasal drip and tinnitus. Negative for congestion, nosebleeds, rhinorrhea, sinus pressure, trouble swallowing and voice change.    Eyes: Negative for visual disturbance.   Respiratory: Negative for cough, shortness of breath and wheezing.    Cardiovascular: Negative for chest pain and palpitations.   Gastrointestinal: Negative for constipation, diarrhea, nausea and vomiting.   Endocrine: Negative for polyphagia and polyuria.   Genitourinary: Negative for frequency.   Musculoskeletal: Negative for arthralgias, back pain, gait problem and myalgias.   Skin: Negative for rash.   Allergic/Immunologic: Negative for environmental allergies.   Neurological: Positive for dizziness and headaches. Negative for weakness.   Hematological: Negative for  "adenopathy.   Psychiatric/Behavioral: Negative for dysphoric mood and sleep disturbance. The patient is nervous/anxious.      Current Medications: Azelastine, bupropion xl, jeremi, escitalopram, fluticasone propionate, lorazepam, montelukast, ondansetron odt, polyethylene glycol, toprol xl, and triamterene-hydrochlorothiazide.    Physical Exam:    Visit Vitals  /74 (BP Location: Left upper arm, Patient Position: Sitting)   Pulse 97   Ht 1.676 m (5' 6\")   Wt 63.5 kg (140 lb)   SpO2 99%   BMI 22.60 kg/m²     General:  Well-developed, well-nourished 29 y.o. who is in no acute distress.  Voice: Normal without hoarseness, breathiness or stridor.  Head/face:  No scars or lesions.  No parotid masses. No presinus tenderness. Seventh cranial nerves intact.  Eyes:  Extraocular movements and gaze alignment normal.  No spontaneous or inducible nystagmus.  Ears: Auricles normal.  External auditory canals free of cerumen.  Tympanic membranes clear, mobile and without retraction or scar.  Nose:  Dorsum straight.  Septum sinusoidal.  Turbinates edematous but normal in size and orientation.  No pus, polyps or crusts.  Oral Cavity/oropharynx: Normal tongue thrust. Normal gag reflex. No masses, leukoplakia, erythroplakia, ulcers or other abnormalities at the tongue, floor of mouth, buccal mucosa, palate or posterior pharyngeal wall.  Larynx/hypopharynx:  Normal supraglottis.  Vocal folds smooth and white.  Arytenoids sharp and mobile.  Interarytenoid and post-glottic mucosa normal.  No masses at the base of tongue, vallecula or piriform sinuses.  Neck:  No masses, adenopathy, cervical spasm or thyroid enlargement.  Cranial Nerves II through XII: Grossly intact.  Mental status: Awake, alert and oriented ×3.  No anxiety or depression.    Audiogram: Normal hearing across all frequencies on the left.  Moderate rising low-frequency sensorineural hearing loss on the right.  Discrimination scores: 96% at 60 dB on the left, 84% at 65 dB " on the right.    Tympanogram: Normal bilaterally.      Impression:  1.  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  2.  Chronic rhinitis.    Recommendations and Plan:  1.  Monitor the sodium more closely.  Keep sodium level less than 3000 mg/day.  Use the sodium tracker dannielle for iPhone.  2.  Drink more water.  At least 8 glasses of water per day.  Avoid caffeine.  3.  Dyazide, 1 tablet daily.  4.  Continue the vestibular exercises if they are helping.  5.  Follow-up in 3 months.  Hearing aid for the right ear should be considered.  This would help with the tinnitus as well.        Krissy Hunter MD

## 2019-12-19 NOTE — PROGRESS NOTES
VESTIBULAR PT FLOWSHEET     PT Vestibular Exercises Current Session Time Done Today   NEURO RE-ED TOTAL TIME FOR SESSION 38-52 Minutes     CP to neck for nausea During BPPV testing N           VOR CANCELLATION       Standing H/VVOR-C Holding Peter on goni:  HVOR-C x10 (mild dizziness)  VVOR-C x10 (mild dizziness)    N  N   Ambulation w/ H/VVOR-C Holding  on goni: 50 ft x4 each  HVOR-C (mild dizziness)  VVOR-C (mild dizziness)     Yes  yes   VOR / GAZE STABILITY       Seated H/VVOR H/VVOR using med B on goni:  At SSS x 30 sec each (mild dizziness, vertical >horizontal) N   Standing H/VVOR NBOS HVOR using med B on plain wall, tempo 90 BPM 2x 45 sec  (+ imbalance and dizziness, rated 3.5 then 3/5)  NBOS on VVOR using med B on plain wall, tempo 90 BPM 45 sec  (+imbalance and dizziness 2.5/5 ) N           N   Ambulation w/H/VVOR 50 ft x4 holding med B on goni:  HVOR and VVOR  Y   H/VVOR on compliant surfaces WBOS foam HVOR using med B on tablecloth tempo 95 BPM (on mute) 2x1 min  (+ imbalance and dizziness, rated 2/5)  WBOS foam VVOR using med B on tablecloth, tempo 100 BPM (on mute) 2x1 min  (+imbalance and dizziness 2.5/5 ) Y   H/VVOR on sway surfaces       Disco ball        DVA/GST PTT WNL, DVA WNL, GST WNL both horizontal and vertical N   HABITUATION       Ball circles       Repetitive functional movements Supine to sit  Nose to knee  Head turns L to R  Head nods up and down  180 deg turns  Wall rolls  Ball toss/catch from R to L and L to R x 10 5/10 symptoms after each  n                 n   MSQ 9/25: 17.6%  11/26: 4%  12/19: 7.3% Y   BALANCE- STATIC       On floor       Airex foam WBOS EO/EC x 1 min each  NBOS EO/EC x 1 min each N   Rockerboard               SOT 9/19: WNL on IE N   BALANCE- DYNAMIC       Ambulation -head turns/nods/EC amb with head turns 50 ft x 4 q 3 steps  amb with head nods 50 ft x 4 q 3 steps  amb with diagonal head turns 50 ft x 2 q 3 steps.  N  N  N   Amb - 180 & 360 degree turns Amb 5  steps then 180 deg turn 50 ftx2  Amb 3 steps then 180 deg turn 50 ftx4  Amb 5 steps then 360 deg turn 50 ftx2  Amb 3 steps then 360 deg turn 50 ftx2  No  No  No  No   Walking with ball toss/catch to PT lateral to pt 50 ftx4 to the L then R Y   Walk with ball toss/catch to PT behind pt 50 ftx4 N   Retro ambulation EO/EC       Obstacles       FGA 9/25: 27/30 N   OPTOKINETIC STIMULATION               THER-EX  TOTAL TIME FOR SESSION Not performed     CARDIOVASCULAR        Recumbent bike       Treadmill        THER ACT TOTAL TIME FOR SESSION 8-22 min          DHI  12/19: 54% Y   Patient Education 9/19: results of testing, function of vestibular system  9/25: results of testing, education on the anatomy of vestibular system, education in a HEP (seated VORx1)  10/8: results of testing; advanced HEP to standing using metronome at 105 BPM x 30-45 sec each and standing on pillow in corner EO and EC  11/26: updated HEP to include sitting or standing VOR-C then progress to walking VOR-C by hallway wall, head turns/nods then progress to walking head turns/nods, nose to knee habituation  12/12: reviewed pain, meds, HEP  12/19: reviewed results of testing, updated HEP to include wall rolls, nose to knee, head turns/nods Y      BPPV: 9/25/19  Test Nystagmus Symptoms Comments   R [place] Hallpike neg + dizziness     L [place] Hallpike neg + dizziness     Deep head hang         Sit to supine neg       Horizontal Roll Test to right neg none     Horizontal Roll Test to left neg none     [place] and Lean (Neutral, Flexion, Extension)         Sidelying test right         Sidelying test left

## 2019-12-23 NOTE — PROGRESS NOTES
Referring Provider: By co-signing this Plan of Care (POC), you agree with the planned services and interventions recommended by the therapist.       NAME: _____________________________ DATE: _______________      [facility] Fax: [phone]      PT RE-EVALUATION FOR OUTPATIENT THERAPY    Patient: Mona Traylor   MRN: [id number]  : 1990 29 y.o.  Referring Physician: Krissy Hunter MD  Date of Visit: 2019      New Certification Dates: 19 through 20    Recommended Frequency & Duration:  1 time/week for up to 8 weeks     Diagnosis:   1. Vestibular hypofunction of right ear        Chief Complaints:   Chief Complaint   Patient presents with   • Dizziness   • Balance Deficits       Precautions: other (see comments), fall(dizziness)    TODAY'S VISIT:    General Information - 19 170        Session Details    Document Type  re-evaluation     Mode of Treatment  physical therapy     Patient/Family/Caregiver Comments/Observations  Pt came from seeing Dr Hunter. She had another audiogram and hearing test is about the same/normal on the R and a little worse on the R, therefore, she is thinking Meneire's. She will focus on managing salt intake and increasing the diuretic to 1x/day vs every other day. Also may consider a hearing aid.     OP Specialty  Vestibular        Time Calculation    Start Time  1707     Stop Time  1801     Time Calculation (min)  54 min        General Information    Patient Profile Reviewed?  yes     Referring Physician  Dr Hunter     Existing Precautions/Restrictions  other (see comments);fall    dizziness        Pain/Vitals - 19        Pain Assessment    Currently in pain  No/Denies        Pain Intervention    Intervention   none     Post Intervention Comments  none         Daily Falls Screen - 19 1711        Daily Falls Assessment    Patient reported fall since last visit  No         Daily Treatment Assessment and Plan - 19   "      Daily Treatment Assessment and Plan    Progress toward goals  Progressing     Daily Outcome Summary  Pt was dx with Meneire's by Dr Hunter today since there has not been much change in her audiogram on the R ear per pt. She will start to monitor her salt intake and her diuretic was increased to 1x/day. Re-eval completed today. MSQ was done and is slightly worse (7.3%). DHI is 54%, slightly worse than previous. Pt was able to advance gaze stability ex's by using compliant surface, visual stimulation with table cloth and longer duration. Pt does not like the metronome because it \"messes her up\", so performed at [facility].Updated HEP including habituation nose to knee, wall rolls and head turns/nods.      Plan and Recommendations  Continue vestibular rehab 1x/wk x 8 weeks for advancement of gaze stabilization, habituation and balance training.            OBJECTIVE MEASUREMENTS/DATA:    Vestibular    PT Vestibular Evaluation - 12/19/19 1800        Motion Sensitivity    Motion Sensitivity Quotient Percentage (%)  7.3 percent     Motion Sensitivity number of provoking positions  6        Other Outcome Measures    Dizziness Handicap Inventory Score  54           Today's Treatment::    VESTIBULAR PT FLOWSHEET     PT Vestibular Exercises Current Session Time Done Today   NEURO RE-ED TOTAL TIME FOR SESSION 38-52 Minutes     CP to neck for nausea During BPPV testing N           VOR CANCELLATION       Standing H/VVOR-C Holding Peter on goni:  HVOR-C x10 (mild dizziness)  VVOR-C x10 (mild dizziness)    Yes  yes   Ambulation w/ H/VVOR-C Holding Peter on goni: 50 ft x2 each  HVOR-C (mod dizziness)  VVOR-C (mod dizziness)     Yes  yes   VOR / GAZE STABILITY       Seated H/VVOR H/VVOR using med B on goni:  At SSS x 30 sec each (mild dizziness, vertical >horizontal) N   Standing H/VVOR NBOS HVOR using med B on plain wall, tempo 90 BPM 2x 45 sec  (+ imbalance and dizziness, rated 3.5 then 3/5)  NBOS VVOR using med B on plain wall, " tempo 90 BPM 2x 45 sec  (+imbalance and dizziness 2.5/5 ) Yes           yes   Ambulation w/H/VVOR       H/VVOR on compliant surfaces       H/VVOR on sway surfaces       Functional VOR       DVA/GST PTT WNL, DVA WNL, GST WNL both horizontal and vertical N   HABITUATION       Ball circles       Repetitive functional movements Supine to sit  Nose to knee  Head turns L to R  Head nods up and down  180 deg turns  Wall rolls  Ball toss/catch from R to L and L to R x 10 5/10 symptoms after each  n                 yes   MSQ 9/25: 17.6%  11/26: 4% n   BALANCE- STATIC       On floor       Airex foam WBOS EO/EC x 1 min each  NBOS EO/EC x 1 min each N   Rockerboard               SOT 9/19: WNL on IE N   BALANCE- DYNAMIC       Ambulation -head turns/nods/EC amb with head turns 50 ft x 4 q 3 steps  amb with head nods 50 ft x 4 q 3 steps  amb with diagonal head turns 50 ft x 2 q 3 steps.  Y  Y  N   Amb - 180 & 360 degree turns Amb 5 steps then 180 deg turn 50 ftx2  Amb 3 steps then 180 deg turn 50 ftx4  Amb 5 steps then 360 deg turn 50 ftx2  Amb 3 steps then 360 deg turn 50 ftx2  No  Yes  No  Yes   Walking with ball toss/catch to PT lateral to pt 50 ftx4 to the L then R Y   Walk with ball toss/catch to PT behind pt 50 ftx4 Y   Retro ambulation EO/EC       Obstacles       FGA 9/25: 27/30 N   OPTOKINETIC STIMULATION               THER-EX  TOTAL TIME FOR SESSION Not performed     CARDIOVASCULAR        Recumbent bike       Treadmill        THER ACT TOTAL TIME FOR SESSION 8-22 min     Patient Education 9/19: results of testing, function of vestibular system  9/25: results of testing, education on the anatomy of vestibular system, education in a HEP (seated VORx1)  10/8: results of testing; advanced HEP to standing using metronome at 105 BPM x 30-45 sec each and standing on pillow in corner EO and EC  11/26: updated HEP to include sitting or standing VOR-C then progress to walking VOR-C by hallway wall, head turns/nods then progress to  walking head turns/nods, nose to knee habituation  12/12: reviewed pain, meds, HEP n      BPPV: 9/25/19  Test Nystagmus Symptoms Comments   R [place] Hallpike neg + dizziness     L [place] Hallpike neg + dizziness     Deep head hang         Sit to supine neg       Horizontal Roll Test to right neg none     Horizontal Roll Test to left neg none     [place] and Lean (Neutral, Flexion, Extension)         Sidelying test right         Sidelying test left                 Goals:  Goals     • Patient stated goals (pt-stated)      To return to feeling 100%    12/19: Global rating scale is 50-60%.      • Vestibular Goals      Short Term Goals Time Frame Result Comment/Progress   Patient will decrease Motion Sensitivity Quotient to TBA for increased functional tolerance.   4-6 weeks met    Patient will increase FGA score to TBA for increased gait stability and balance.   4-6 weeks met    Patient will improve DVA (Dynamic Visual Acuity) to </= TBA for functional improved gaze stability. 4-6 weeks met    Patient will achieve TBA on GST (Gaze Stability Test) for improved gaze stability.   4-6 weeks met    Patient will perform home exercise program with supervision.   4-6 weeks met    Patient will tolerate 10 minutes of cardiovascular conditioning. 4-6 weeks     Patient will negotiate curbs/ramps/uneven surfaces without LOB. 4-6 weeks     Patient will have negative BPPV all canals for symptom-free functional mobility. 4-6 weeks met              Long Term Goals Time Frame Result Comment/Progress   Motion Sensitivity Quotient to <2% for increased functional tolerance.   8-12 weeks ongoing 17.6%  11/26: 4%  12/19: 7.3%   Patient will increase FGA score to >24/30 for increased gait stability, safety and functional mobility.   8-12 weeks met 27/30   Patient will decrease DVA to < or =2 lines for functional improved gaze stability. 8-12 weeks met    Patient will increase GST scores to >120 deg/sec for improved gaze stability.   8-12 weeks met     Patient will perform home exercise program independently.   8-12 weeks     Patient will tolerate 15 minutes of cardiovascular conditioning. 8-12 weeks     Patient will ambulate in community without LOB and without dizziness. 8-12 weeks     Patient will have negative BPPV all canals for symptom-free functional mobility. 8-12 weeks met    Patient will return to (driving / work / school / sports).   8-12 weeks     Patient will improve score on DHI to <10% for decreased report of symptoms with daily activities. 8-12 weeks ongoing 11/26: DHI 48%  12/19: 54%   Patient will have no increased symptoms with challenging VOR activities for symptom free high level activities.   8-12 weeks                           This 29 y.o. year old female presents to PT with above stated diagnosis. Physical Therapy evaluation reveals impaired balance, other (see comments)(dizziness) resulting in home management, work, community/leisure limitations. Mona Traylor will benefit from skilled PT services to address limitation, work towards rehab and patient goals and maximize PLOF of chosen ADLs.     Planned Services: The patient's treatment will include CPT 60650 Therapeutic activities, CPT 16658 Neuromuscular Reeducation, CPT 58798 Therapeutic exercises, CPT 71329 Canalith repositioning procedure/maneuvers, CPT 59407 Hot/Cold Packs therapy, CPT 14928 Gait training, .

## 2020-03-20 ENCOUNTER — CLINICAL SUPPORT (OUTPATIENT)
Dept: OTHER | Facility: CLINIC | Age: 30
End: 2020-03-20
Attending: NURSE PRACTITIONER
Payer: COMMERCIAL

## 2020-03-20 DIAGNOSIS — R50.9 FEVER, UNSPECIFIED: Primary | ICD-10-CM

## 2020-03-20 DIAGNOSIS — R50.9 FEVER, UNSPECIFIED: ICD-10-CM

## 2020-03-20 NOTE — PROGRESS NOTES
Patient was processed today at [facility] drive-up clinic.  Pt denies any sort of medical distress today.  After identifying the patient, a nasopharyngeal sample was obtained and placed in viral transport medium.  Pt was given a post-collection education sheet and encouraged to self-isolate until they receive the results.  Pt directed to [facility] website for [facility] Privacy Practices.  Sample sent to [facility] for processing.  Pt was without additional questions or concerns and was dispositioned from the testing area to home.

## 2020-03-25 LAB
QUEST OVERALL RESULT:: NOT DETECTED
SARS-COV RNA SPEC QL NAA+PROBE: NEGATIVE
SARS-COV-2 RNA RESP QL NAA+PROBE: NEGATIVE
SPECIMEN SOURCE: NORMAL
SYMPTOM: NORMAL

## 2020-05-20 DIAGNOSIS — H83.2X1 VESTIBULAR HYPOFUNCTION OF RIGHT EAR: Primary | ICD-10-CM

## 2020-05-20 NOTE — PROGRESS NOTES
PT DISCHARGE NOTE FOR OUTPATIENT THERAPY    Patient: Mona Traylor   MRN: [id number]  : 1990 30 y.o.  Referring Physician: No ref. provider found  Date of Visit: 2020      Certification Dates:  19 through  20    Total Visit Count: 9    Pt has not attended PT since 19, therefore, pt is discharged at this time. Pt was instructed in a HEP.       Goals Addressed                 This Visit's Progress    • Vestibular Goals        Pt has not returned since 19 therefore, unable to assess.     Short Term Goals Time Frame Result Comment/Progress   Patient will decrease Motion Sensitivity Quotient to TBA for increased functional tolerance.   4-6 weeks met    Patient will increase FGA score to TBA for increased gait stability and balance.   4-6 weeks met    Patient will improve DVA (Dynamic Visual Acuity) to </= TBA for functional improved gaze stability. 4-6 weeks met    Patient will achieve TBA on GST (Gaze Stability Test) for improved gaze stability.   4-6 weeks met    Patient will perform home exercise program with supervision.   4-6 weeks met    Patient will tolerate 10 minutes of cardiovascular conditioning. 4-6 weeks     Patient will negotiate curbs/ramps/uneven surfaces without LOB. 4-6 weeks     Patient will have negative BPPV all canals for symptom-free functional mobility. 4-6 weeks met              Long Term Goals Time Frame Result Comment/Progress   Motion Sensitivity Quotient to <2% for increased functional tolerance.   8-12 weeks ongoing 17.6%  : 4%  : 7.3%   Patient will increase FGA score to >24/30 for increased gait stability, safety and functional mobility.   8-12 weeks met    Patient will decrease DVA to < or =2 lines for functional improved gaze stability. 8-12 weeks met    Patient will increase GST scores to >120 deg/sec for improved gaze stability.   8-12 weeks met    Patient will perform home exercise program independently.   8-12 weeks     Patient will  tolerate 15 minutes of cardiovascular conditioning. 8-12 weeks     Patient will ambulate in community without LOB and without dizziness. 8-12 weeks     Patient will have negative BPPV all canals for symptom-free functional mobility. 8-12 weeks met    Patient will return to (driving / work / school / sports).   8-12 weeks     Patient will improve score on DHI to <10% for decreased report of symptoms with daily activities. 8-12 weeks ongoing 11/26: DHI 48%  12/19: 54%   Patient will have no increased symptoms with challenging VOR activities for symptom free high level activities.   8-12 weeks

## 2020-07-28 ENCOUNTER — TELEMEDICINE (OUTPATIENT)
Dept: OTOLARYNGOLOGY | Facility: CLINIC | Age: 30
End: 2020-07-28
Payer: COMMERCIAL

## 2020-07-28 VITALS
TEMPERATURE: 97.5 F | DIASTOLIC BLOOD PRESSURE: 70 MMHG | SYSTOLIC BLOOD PRESSURE: 100 MMHG | WEIGHT: 135 LBS | HEIGHT: 66 IN | BODY MASS INDEX: 21.69 KG/M2

## 2020-07-28 DIAGNOSIS — J30.1 SEASONAL ALLERGIC RHINITIS DUE TO POLLEN: ICD-10-CM

## 2020-07-28 DIAGNOSIS — H90.41 SENSORINEURAL HEARING LOSS (SNHL) OF RIGHT EAR WITH UNRESTRICTED HEARING OF LEFT EAR: ICD-10-CM

## 2020-07-28 DIAGNOSIS — H81.01 MENIERE'S DISEASE OF RIGHT EAR: Primary | ICD-10-CM

## 2020-07-28 DIAGNOSIS — H93.11 TINNITUS OF RIGHT EAR: ICD-10-CM

## 2020-07-28 PROCEDURE — 99214 OFFICE O/P EST MOD 30 MIN: CPT | Mod: 95 | Performed by: OTOLARYNGOLOGY

## 2020-07-28 RX ORDER — CITALOPRAM 20 MG/1
20 TABLET, FILM COATED ORAL DAILY
COMMUNITY
Start: 2015-10-15 | End: 2020-09-17 | Stop reason: ALTCHOICE

## 2020-07-28 RX ORDER — AZELASTINE 1 MG/ML
1 SPRAY, METERED NASAL DAILY
Qty: 30 ML | Refills: 5 | Status: SHIPPED | OUTPATIENT
Start: 2020-07-28 | End: 2020-09-17 | Stop reason: ALTCHOICE

## 2020-07-28 ASSESSMENT — ENCOUNTER SYMPTOMS
FREQUENCY: 0
SLEEP DISTURBANCE: 0
CONSTIPATION: 0
VOMITING: 0
PALPITATIONS: 0
DIZZINESS: 1
POLYPHAGIA: 0
FATIGUE: 0
SHORTNESS OF BREATH: 0
MYALGIAS: 0
DYSPHORIC MOOD: 0
BACK PAIN: 0
HEADACHES: 0
DIARRHEA: 0
TROUBLE SWALLOWING: 0
FEVER: 0
NERVOUS/ANXIOUS: 0
WHEEZING: 0
RHINORRHEA: 0
ARTHRALGIAS: 0
COUGH: 0
WEAKNESS: 0
VOICE CHANGE: 0
SINUS PRESSURE: 1
ADENOPATHY: 0
NAUSEA: 0

## 2020-07-28 NOTE — PATIENT INSTRUCTIONS
1.  Excellent hydration, at least 48 ounces of water per day but must balance this out with electrolytes.  2.  Decrease Dyazide to 1 tablet 3 days out of every 4.  3.  Simply saline nasal mist, 2 squirts to each nostril twice daily.  4.  Flonase Sensimist, 1 squirt to each nostril each morning.  5.  Astelin, 1 squirt to each nostril each evening.  6.  Follow-up in office for a visit with audiogram in September.

## 2020-07-28 NOTE — PROGRESS NOTES
"  Verification of Patient Location:  The patient affirms they are currently located in the following state: [place]    Request for Consent:    Audio Only Encounter   You and I are about to have a telemedicine check-in or visit. This is allowed because you have requested it. This telemedicine visit will be billed to your health insurance or you, if you are self-insured. You understand you will be responsible for any copayments or coinsurances that apply to your telemedicine visit. Before starting our telemedicine visit, I am required to get your consent for this virtual check-in or visit by telemedicine. Do you consent?    Patient Response to Request for Consent:  Yes      Visit Documentation:  Subjective     Patient ID: Mona Traylor is a 30 y.o. female.  I confirmed that her YOB: 1990.    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  Her hearing had gotten a bit worse on the right and I suggested a hearing aid for that side.  She elected against a hearing aid because she feels that she is \"still doing very well with her hearing\".  I also suggested that she continue on with vestibular therapy to help with habituation.  She had a few visits but did not complete the therapy because her symptoms cleared up.  She was to have seen me in March for an audiogram, but because of the pandemic, her appointment was canceled.      She decided to make this telemedicine visit to catch up on her symptoms and so that we could make a plan going forward.    Mona had a severe URI in March but testing for COVID-19 was negative.  That time she felt well and had actually been free of vertigo for 6 months.  However, over the past 2 weeks she has noticed increased lightheadedness and disequilibrium.  It should be noted that she suffered a true syncopal event on July 4.  " This was deemed to be due to dehydration.  Apparently she spent the day outside and did not hydrate herself well.  She continues to take Dyazide every day.  She feels that this is actually helped her with the vertigo.  Also, she was placed on increased doses of Wellbutrin in June but this caused a greatly elevated heart rate.  Now that the dose is back to baseline, her heart rate and blood pressure are also back to baseline.    Mona has felt subtle dizziness and subtle disequilibrium a few times in the past 14 days.  She also has increased right ear fullness and diminished hearing on that side.  She has not had a full-blown vertigo attack.  She has been clearing her throat somewhat frequently and has had increased postnasal drip.  She has not had a headache.  She has not had a recent upper respiratory infection.  She denies facial pain.  Her secretions are all clear.  She does not have a cough, shortness of breath or wheezing.  When the vertigo is significant, she must rest.  She notes that by keeping herself well-hydrated, she definitely feels better.  She has not been using any of her allergy medications.    Current medications: [place], Celexa, Dyazide, escitalopram, lorazepam as needed, montelukast, MiraLAX, Toprol XL, Wellbutrin and Zofran when needed for nausea.    The following have been reviewed and updated as appropriate in this visit:  Tobacco  Allergies  Meds  Problems  Med Hx  Surg Hx  Fam Hx  Soc Hx        Review of Systems   Constitutional: Negative for fatigue and fever.   HENT: Positive for congestion, hearing loss, postnasal drip, sinus pressure and tinnitus. Negative for nosebleeds, rhinorrhea, trouble swallowing and voice change.    Eyes: Negative for visual disturbance.   Respiratory: Negative for cough, shortness of breath and wheezing.    Cardiovascular: Negative for chest pain and palpitations.   Gastrointestinal: Negative for constipation, diarrhea, nausea and vomiting.   Endocrine:  Negative for polyphagia and polyuria.   Genitourinary: Negative for frequency.   Musculoskeletal: Negative for arthralgias, back pain, gait problem and myalgias.   Skin: Negative for rash.   Allergic/Immunologic: Negative for environmental allergies.   Neurological: Positive for dizziness. Negative for weakness and headaches.   Hematological: Negative for adenopathy.   Psychiatric/Behavioral: Negative for dysphoric mood and sleep disturbance. The patient is not nervous/anxious.          Assessment/Plan   Impression:  1.  Right Ménière's syndrome with vertigo and sensorineural hearing loss.  Slight flareup of lightheadedness associated with increase in allergy symptoms and possible relative dehydration.    2.  Allergic rhinitis.    Recommendations and plan:  1.  Excellent hydration, at least 48 ounces of water per day but must balance this out with electrolytes.  2.  Decrease Dyazide to 1 tablet 3 days out of every 4.  3.  Simply saline nasal mist, 2 squirts to each nostril twice daily.  4.  Flonase Sensimist, 1 squirt to each nostril each morning.  5.  Astelin, 1 squirt to each nostril each evening.  6.  Follow-up in office for a visit with audiogram in September.    Time Spent in Medical Discussion During This Encounter:    25 minutes       Krissy Hunter MD

## 2020-07-31 ENCOUNTER — TELEPHONE (OUTPATIENT)
Dept: OTOLARYNGOLOGY | Facility: CLINIC | Age: 30
End: 2020-07-31

## 2020-07-31 DIAGNOSIS — G47.33 OBSTRUCTIVE SLEEP APNEA SYNDROME: Primary | ICD-10-CM

## 2020-07-31 NOTE — TELEPHONE ENCOUNTER
----- Message from Krissy Hunter MD sent at 7/31/2020 12:59 PM EDT -----  Regarding: FW: FW: Non-Urgent Medical Question  Contact: [phone]  The patient does not want sleep study.  Please call her and set up a visit with audiogram.    ----- Message -----  From: Geovanna Graves MA  Sent: 7/31/2020  12:00 PM EDT  To: Krissy Hunter MD  Subject: FW: FW: Non-Urgent Medical Question                  ----- Message -----  From: Mona Traylor  Sent: 7/31/2020  10:28 AM EDT  To: [facility]  Subject: RE: FW: Non-Urgent Medical Question              Hi Dr. Hunter,    I believe the gasping was because the pain was so intense it scared me awake! I have never had issues with any sort of breathing issues while sleeping so I do not feel the sleep study is necessary but I will contact Dr. Gutiérrez office. I will await the call from your office to schedule a follow up audiogram.       Thanks for the fast replyMona    ----- Message -----  From: Krissy Hunter MD  Sent: 7/31/20, 9:43 AM  To: Mona Traylor  Subject: RE: FW: Non-Urgent Medical Question    Lester Dunn,  This symptom is a 'neuralgia.'  It means that the sensory nerve of the tongue is sending a bad message.  I do not know if this is because of the brain issue or something else.  I think you should call Dr. Mauricio's office first.  They may want you to get other scans, such as an MRI of your cervical spine.    The part about gasping does not really go along with the burning sensation in the tip of the tongue.  It may be a sign of sleep apnea.    I ordered an at-home sleep study.  Melinda will call you about setting that up..    Good luckBhaskar MD    ----- Message -----  From: Mona Traylor  Sent: 7/30/2020   8:08 PM EDT  To: [facility]  Subject: Non-Urgent Medical Question                      Hi Dr. Hunter,    I completely forgot to mention this during our phone call the other day! In the last 6  weeks or so (give or take) I have experienced a shock like sensation on the tip of my tongue about 5 or 6 times. It only happens when I am asleep and the sensation/pain jolts me awake, sometimes I even sit straight up. I wake up kind of spitting/blowing through my lips (like when you have a piece of hair stuck on your tongue and you're moving your tongue rapidly in and out trying to get it out of your mouth if that makes sense). The sensation only lasts seconds and I typically fall back asleep quickly but it's extremely painful (I compare it to touching your tongue to a 9V battery but multiply that by 100). I almost always wake up with the tip of my tongue feeling as though I burnt my taste buds. The first time it happened I thought maybe somehow my phone/ shocked me since I fell asleep with it under my pillow but it has also happened when my phone is on my nightstand. (Also, the first time  it happened I scared the bejeezus out of my boyfriend by gasping and sitting up doing that spitting thing but couldn't talk.)  I don't know what could be causing it and I'm not sure who to even reach out to..? Dr. Mauricio had previously told me the lesion on my brain was in the motor cortex and if anything it would affect my arm/hand on my left side so I don't think it could be that but I'm at a loss. It's such a strange and uncomfortable feeling, especially when it wakes you from a deep sleep.     Let me know your thoughts! I swear I'm not crazy! :)     Thanks,    Mona Traylor   No

## 2020-07-31 NOTE — TELEPHONE ENCOUNTER
----- Message from Krissy Hunter MD sent at 7/31/2020  9:40 AM EDT -----  Regarding: FW: Non-Urgent Medical Question  Contact: [phone]  Please call pt and help her set up at home sleep study. NS.    ----- Message -----  From: Geovanna Graves MA  Sent: 7/31/2020   8:57 AM EDT  To: Krissy Hutner MD  Subject: FW: Non-Urgent Medical Question                      ----- Message -----  From: Mona Murilloper  Sent: 7/30/2020   8:08 PM EDT  To: [facility] Clinical Support [facility]  Subject: Non-Urgent Medical Question                      Hi Dr. Hunter,    I completely forgot to mention this during our phone call the other day! In the last 6 weeks or so (give or take) I have experienced a shock like sensation on the tip of my tongue about 5 or 6 times. It only happens when I am asleep and the sensation/pain jolts me awake, sometimes I even sit straight up. I wake up kind of spitting/blowing through my lips (like when you have a piece of hair stuck on your tongue and you're moving your tongue rapidly in and out trying to get it out of your mouth if that makes sense). The sensation only lasts seconds and I typically fall back asleep quickly but it's extremely painful (I compare it to touching your tongue to a 9V battery but multiply that by 100). I almost always wake up with the tip of my tongue feeling as though I burnt my taste buds. The first time it happened I thought maybe somehow my phone/ shocked me since I fell asleep with it under my pillow but it has also happened when my phone is on my nightstand. (Also, the first time  it happened I scared the bejeezus out of my boyfriend by gasping and sitting up doing that spitting thing but couldn't talk.)  I don't know what could be causing it and I'm not sure who to even reach out to..? Dr. Mauricio had previously told me the lesion on my brain was in the motor cortex and if anything it would affect my arm/hand on my left side so I don't think it could be  that but I'm at a loss. It's such a strange and uncomfortable feeling, especially when it wakes you from a deep sleep.     Let me know your thoughts! I swear I'm not crazy! :)     Thanks,    Mona Traylor

## 2020-08-07 ENCOUNTER — TELEPHONE (OUTPATIENT)
Dept: OTOLARYNGOLOGY | Facility: CLINIC | Age: 30
End: 2020-08-07

## 2020-09-17 ENCOUNTER — OFFICE VISIT (OUTPATIENT)
Dept: OTOLARYNGOLOGY | Facility: CLINIC | Age: 30
End: 2020-09-17
Payer: COMMERCIAL

## 2020-09-17 ENCOUNTER — PROCEDURE VISIT (OUTPATIENT)
Dept: OTOLARYNGOLOGY | Facility: CLINIC | Age: 30
End: 2020-09-17
Payer: COMMERCIAL

## 2020-09-17 VITALS
HEIGHT: 66 IN | SYSTOLIC BLOOD PRESSURE: 100 MMHG | TEMPERATURE: 97.5 F | OXYGEN SATURATION: 98 % | HEART RATE: 79 BPM | DIASTOLIC BLOOD PRESSURE: 70 MMHG | WEIGHT: 135 LBS | BODY MASS INDEX: 21.69 KG/M2

## 2020-09-17 DIAGNOSIS — H81.01 ACTIVE COCHLEAR MENIERE'S DISEASE OF RIGHT EAR: Primary | ICD-10-CM

## 2020-09-17 DIAGNOSIS — H81.01 MENIERE'S DISEASE OF RIGHT EAR: Primary | ICD-10-CM

## 2020-09-17 DIAGNOSIS — H90.41 SENSORINEURAL HEARING LOSS (SNHL) OF RIGHT EAR WITH UNRESTRICTED HEARING OF LEFT EAR: ICD-10-CM

## 2020-09-17 DIAGNOSIS — H93.11 TINNITUS OF RIGHT EAR: ICD-10-CM

## 2020-09-17 DIAGNOSIS — H69.91 DYSFUNCTION OF RIGHT EUSTACHIAN TUBE: ICD-10-CM

## 2020-09-17 DIAGNOSIS — J30.1 SEASONAL ALLERGIC RHINITIS DUE TO POLLEN: ICD-10-CM

## 2020-09-17 DIAGNOSIS — G52.1 NEURALGIA OF RIGHT GLOSSOPHARYNGEAL NERVE: ICD-10-CM

## 2020-09-17 PROCEDURE — 92557 COMPREHENSIVE HEARING TEST: CPT | Performed by: AUDIOLOGIST

## 2020-09-17 PROCEDURE — 92567 TYMPANOMETRY: CPT | Performed by: AUDIOLOGIST

## 2020-09-17 PROCEDURE — 99213 OFFICE O/P EST LOW 20 MIN: CPT | Mod: 25 | Performed by: OTOLARYNGOLOGY

## 2020-09-17 PROCEDURE — 99999 PR OFFICE/OUTPT VISIT,PROCEDURE ONLY: CPT | Performed by: AUDIOLOGIST

## 2020-09-17 ASSESSMENT — ENCOUNTER SYMPTOMS
RHINORRHEA: 0
PALPITATIONS: 0
CONSTIPATION: 0
FATIGUE: 0
VOMITING: 0
BACK PAIN: 0
SHORTNESS OF BREATH: 0
NERVOUS/ANXIOUS: 0
SLEEP DISTURBANCE: 0
ARTHRALGIAS: 0
FREQUENCY: 0
DIARRHEA: 0
ADENOPATHY: 0
POLYPHAGIA: 0
DIZZINESS: 1
FEVER: 0
WHEEZING: 0
DYSPHORIC MOOD: 0
MYALGIAS: 0
HEADACHES: 0
VOICE CHANGE: 0
SINUS PRESSURE: 0
WEAKNESS: 0
TROUBLE SWALLOWING: 0
COUGH: 0
NAUSEA: 0

## 2020-09-17 NOTE — PATIENT INSTRUCTIONS
1.  Dyazide daily for 3 days then 1 day off cyclically.  2.  Simply saline, 2 squirts to each nostril twice daily.  3.  Flonase Sensimist, 1 squirt to each nostril daily.  4.  Allegra, 180 mg daily.  5.  Vitamin D and B12 levels.  6.  Follow-up in 6 months with audiogram.  We will call you to set this up.

## 2020-09-17 NOTE — PROGRESS NOTES
"    [facility]  [address], [address]  [place] [address]  Phone: [phone]  Fax: [phone]      Patient ID: Mona Traylor                              : 1990    Visit Date: 2020  Referring Provider: Yadira Trujillo MD    Chief Complaint: Meniere's Disease      Mona Traylor returned to the [place] office today in regard to history of right Ménière's syndrome.    I spoke to Mona in a telemedicine visit on 2020.  When I had seen her previously in December, the hearing on the right had dropped below normal across the mid range.  However since then her hearing has improved to a level which is much closer to normal.  I had also recommended that she pursue anticipated vestibular therapy to help with habituation.  Her symptoms of disequilibrium and dizziness cleared up without needing the therapy.  She is now taking her diuretic 4 days out of 5 and continues to do well.  At present, she believes that her hearing is good, her tinnitus is minimal and non-bothersome, she has not had vertigo in months.  She does have some fullness in the right ear right now but relates that more to right-sided nasal congestion and postnasal drip which has increased recently.    Mona was using saline, Flonase and Astelin but developed some blood in her mucus.  She therefore discontinued the sprays.  She continues to take Singulair daily for allergy and asthma but it is really helping with her current nasal symptoms.    Mona reached out to me with an email message a few weeks later to let me know that she was having intermittent \"electrical shocks\" on the tip of her tongue.  She has a right brain lesion which has not grown in several years which is not necessarily a tumor.  I did suggest that she contact Dr. Frankie Mauricio about her symptom.  She will actually be speaking to him next week in a telemedicine visit.  This symptom has come and gone a few times since I spoke to her in July.  There is no " "associated pain down into the throat there is no persistent pain in the face.  This seems to be a intermittent neuralgia of a branch of the glossopharyngeal/lingual nerve.    Review of Systems   Constitutional: Negative for fatigue and fever.   HENT: Positive for hearing loss. Negative for congestion, nosebleeds, postnasal drip, rhinorrhea, sinus pressure, tinnitus, trouble swallowing and voice change.    Eyes: Negative for visual disturbance.   Respiratory: Negative for cough, shortness of breath and wheezing.    Cardiovascular: Negative for chest pain and palpitations.   Gastrointestinal: Negative for constipation, diarrhea, nausea and vomiting.   Endocrine: Negative for polyphagia and polyuria.   Genitourinary: Negative for frequency.   Musculoskeletal: Negative for arthralgias, back pain, gait problem and myalgias.   Skin: Negative for rash.   Allergic/Immunologic: Negative for environmental allergies.   Neurological: Positive for dizziness. Negative for weakness and headaches.   Hematological: Negative for adenopathy.   Psychiatric/Behavioral: Negative for dysphoric mood and sleep disturbance. The patient is not nervous/anxious.      Current Medications: Bupropion xl, jeremi, escitalopram, lorazepam, montelukast, ondansetron odt, polyethylene glycol, toprol xl, and triamterene-hydrochlorothiazide.    Physical Exam:  Vitals:   Visit Vitals  /70 (BP Location: Left upper arm, Patient Position: Sitting)   Pulse 79   Temp 36.4 °C (97.5 °F) (Temporal)   Ht 1.676 m (5' 6\")   Wt 61.2 kg (135 lb)   SpO2 98%   BMI 21.79 kg/m²     General:  Well-developed, well-nourished 30 y.o. who is in no acute distress.  Voice: Normal without hoarseness, breathiness or stridor.  Head/face:  No scars or lesions.  No parotid masses. No presinus tenderness. Seventh cranial nerves intact.  Eyes:  Extraocular movements and gaze alignment normal.  Ears: Auricles normal.  Cerumen only on the left, removed with curette.  Tympanic " "membranes clear, mobile and without retraction or scar.  Nose:  Dorsum straight.  Septum sinusoidal.  Inferior turbinates are hypertrophic and quite edematous.  Particularly on the right, the nasal airway is obstructed.  No pus, polyps or crusts.  Oral Cavity/oropharynx: Normal tongue thrust. Normal gag reflex. No masses, leukoplakia, erythroplakia, ulcers or other abnormalities at the tongue, floor of mouth, buccal mucosa or palate.  Cobblestoning at the posterior pharyngeal wall.  Larynx/hypopharynx: Examination deferred due to the pandemic.  Neck:  No masses, adenopathy, cervical spasm or thyroid enlargement.  Cranial Nerves II through XII: Grossly intact.  Mental status: Awake, alert and oriented ×3.  No anxiety or depression.    Audiogram: Normal hearing across all frequencies on the left.  Mild sensorineural hearing loss above 4000 Hz on the right with pure-tone disparities of 5-20 dB right worse than left within the normal range.  Discrimination scores: 92% at 45 dB on the left, 96% at 55 dB on the right.    Tympanogram: Negative pressure on the right.  Normal on the left.      Impression:  1.  Right Ménière's disease currently with good hearing, decreased tinnitus, but fullness probably exacerbated by allergy symptoms and eustachian tube dysfunction.  2.  History of stable \"brain lesion\" on MRI whose diagnosis remains unknown.  Continued monitoring per Dr. Mauricio at [place].  3.  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  Check vitamin D and B12 levels.    Recommendations and Plan:  1.  Dyazide daily for 3 days then 1 day off cyclically.  2.  Simply Saline, 2 squirts to each nostril twice daily.  3.  Flonase Sensimist, 1 squirt to each nostril daily.  4.  Allegra, 180 mg daily.  5.  Vitamin D and B12 levels.  6.  Follow-up in 6 months with audiogram.  We will call you to set this up.        Krissy Hunter MD              "

## 2020-09-18 ENCOUNTER — TELEPHONE (OUTPATIENT)
Dept: OTOLARYNGOLOGY | Facility: CLINIC | Age: 30
End: 2020-09-18

## 2020-09-18 RX ORDER — FLUOCINOLONE ACETONIDE 0.11 MG/ML
OIL AURICULAR (OTIC)
Qty: 20 ML | Refills: 4 | Status: SHIPPED | OUTPATIENT
Start: 2020-09-18 | End: 2021-03-22 | Stop reason: ALTCHOICE

## 2020-09-18 RX ORDER — ONDANSETRON 4 MG/1
TABLET, ORALLY DISINTEGRATING ORAL
Qty: 60 TABLET | Refills: 3 | Status: SHIPPED | OUTPATIENT
Start: 2020-09-18

## 2020-09-18 NOTE — TELEPHONE ENCOUNTER
Done.  Let her know that she should not use this very often.  Sometimes Lexapro plus Zofran can cause a problem with cardiac rhythm.

## 2020-09-18 NOTE — TELEPHONE ENCOUNTER
For the itchy ears, I have sent in Derm otic oil to her [facility].  She will use this for 2 weeks.  Thereafter, she can use olive oil, 2 drops to each ear 1 night per week.

## 2020-10-05 ENCOUNTER — APPOINTMENT (OUTPATIENT)
Dept: LAB | Facility: HOSPITAL | Age: 30
End: 2020-10-05
Attending: OTOLARYNGOLOGY
Payer: COMMERCIAL

## 2020-10-05 DIAGNOSIS — G52.1 NEURALGIA OF RIGHT GLOSSOPHARYNGEAL NERVE: ICD-10-CM

## 2020-10-05 LAB
25(OH)D3 SERPL-MCNC: 27 NG/ML (ref 30–100)
VIT B12 SERPL-MCNC: 294 PG/ML (ref 180–914)

## 2020-10-05 PROCEDURE — 82607 VITAMIN B-12: CPT

## 2020-10-05 PROCEDURE — 82306 VITAMIN D 25 HYDROXY: CPT

## 2020-10-05 PROCEDURE — 36415 COLL VENOUS BLD VENIPUNCTURE: CPT

## 2020-10-27 DIAGNOSIS — G47.33 OSA (OBSTRUCTIVE SLEEP APNEA): Primary | ICD-10-CM

## 2020-11-03 RX ORDER — TRIAMTERENE AND HYDROCHLOROTHIAZIDE 37.5; 25 MG/1; MG/1
CAPSULE ORAL
Qty: 90 CAPSULE | Refills: 3 | Status: SHIPPED | OUTPATIENT
Start: 2020-11-03 | End: 2021-12-02

## 2020-11-03 NOTE — TELEPHONE ENCOUNTER
Medicine Refill Request    Last Office Visit: Visit date not found  Last Telemedicine Visit: 7/28/2020 Krissy Hunter MD    Next Office Visit: Visit date not found  Next Telemedicine Visit: Visit date not found         Current Outpatient Medications:   •  buPROPion XL (WELLBUTRIN XL) 300 mg 24 hr tablet, , Disp: , Rfl:   •  TIFF 0.35 mg tablet, , Disp: , Rfl:   •  escitalopram (LEXAPRO) 20 mg tablet, , Disp: , Rfl:   •  fluocinolone acetonide oiL 0.01 % drops, 2 drops into each ear at night for 2 weeks and then stop., Disp: 20 mL, Rfl: 4  •  LORazepam (ATIVAN) 0.5 mg tablet, Take 0.5 mg by mouth 2 (two) times a day as needed., Disp: , Rfl: 0  •  montelukast (SINGULAIR) 10 mg tablet, , Disp: , Rfl:   •  ondansetron ODT (ZOFRAN-ODT) 4 mg disintegrating tablet, 1 tablet on tongue -allow to dissolve - every 12 hours for nausea/vomiting, Disp: 60 tablet, Rfl: 3  •  polyethylene glycol (MIRALAX) 17 gram packet, Take 17 g by mouth daily., Disp: , Rfl:   •  TOPROL XL 50 mg 24 hr tablet, , Disp: , Rfl:   •  triamterene-hydrochlorothiazid (DYAZIDE) 37.5-25 mg per capsule, 1 capsule daily, Disp: 90 capsule, Rfl: 3      BP Readings from Last 3 Encounters:   09/17/20 100/70   07/28/20 100/70   12/19/19 102/74       Recent Lab results:  No results found for: CHOL, No results found for: HDL, No results found for: LDLCALC, No results found for: TRIG     No results found for: GLUCOSE, No results found for: HGBA1C      No results found for: CREATININE    No results found for: TSH

## 2020-11-20 ENCOUNTER — HOSPITAL ENCOUNTER (OUTPATIENT)
Dept: SLEEP MEDICINE | Facility: HOSPITAL | Age: 30
Discharge: HOME | End: 2020-11-20
Attending: OTOLARYNGOLOGY
Payer: COMMERCIAL

## 2020-11-20 DIAGNOSIS — G47.33 OSA (OBSTRUCTIVE SLEEP APNEA): ICD-10-CM

## 2020-11-20 PROCEDURE — G0399 HOME SLEEP TEST/TYPE 3 PORTA: HCPCS

## 2020-11-24 NOTE — PROGRESS NOTES
Sleep Study Note    Mona Traylor is a 30 y.o. female    There were no vitals filed for this visit.    Orders Placed This Encounter   Procedures   • Home sleep test       Night 1  Study signal quality:Adequate  AHI:1.9  SACHIN SAO2:89%  Additional comments:    Night 2 (if applicable)  AHI:0.8  Sachin SAO2:89%  Additional comments:    Eliot Beyer  11/24/20 1:19 PM

## 2020-12-09 ENCOUNTER — TRANSCRIBE ORDERS (OUTPATIENT)
Dept: SCHEDULING | Age: 30
End: 2020-12-09

## 2020-12-09 ENCOUNTER — TELEPHONE (OUTPATIENT)
Dept: INFECTIOUS DISEASES | Facility: HOSPITAL | Age: 30
End: 2020-12-09

## 2020-12-09 DIAGNOSIS — R53.83 OTHER FATIGUE: ICD-10-CM

## 2020-12-09 DIAGNOSIS — J06.9 ACUTE UPPER RESPIRATORY INFECTION, UNSPECIFIED: Primary | ICD-10-CM

## 2020-12-09 DIAGNOSIS — J45.31 MILD PERSISTENT ASTHMA WITH (ACUTE) EXACERBATION: ICD-10-CM

## 2020-12-09 DIAGNOSIS — R06.02 SHORTNESS OF BREATH: ICD-10-CM

## 2020-12-09 NOTE — TELEPHONE ENCOUNTER
Please schedule this patient for Covid 19 testing.  I have updated the patient's demographic information with the patient's contact information for scheduling.    Patient symptoms:Cough, Shortness of breath, Congestion / Runny Nose, Sore throat and Chest tightness    Provider is: Independent provider  (Independent providers with [facility] access SHOULD NOT put orders in Epic, results will not route)    Ordering provider (First - Last): Yadira Edwards Best Callback # for NSQ: [phone]    This patient is a [employer] Employee: YES/NO/NA: No  If yes: notify Employee Exposure line and advise ordering provider that Employee's also need to call that team.    If an [facility] provider, advised order needs to be in Epic.    If an independent provider, they are directed to fax the order to [phone]  (Fax should include: patient name, date of birth, Covid 19 [facility] order, ICD-10 for symptoms, and either [facility] account number or fax number for [facility] to send results)    Independent provider results will arrive via [facility]   ML provider results will route via Epic

## 2020-12-10 ENCOUNTER — CLINICAL SUPPORT (OUTPATIENT)
Dept: OTHER | Facility: CLINIC | Age: 30
End: 2020-12-10
Attending: FAMILY MEDICINE

## 2020-12-10 DIAGNOSIS — J06.9 ACUTE UPPER RESPIRATORY INFECTION, UNSPECIFIED: ICD-10-CM

## 2020-12-10 DIAGNOSIS — R06.02 SHORTNESS OF BREATH: ICD-10-CM

## 2020-12-10 DIAGNOSIS — J45.31 MILD PERSISTENT ASTHMA WITH (ACUTE) EXACERBATION: ICD-10-CM

## 2020-12-10 DIAGNOSIS — R53.83 OTHER FATIGUE: ICD-10-CM

## 2020-12-10 NOTE — PROGRESS NOTES
Patient was processed today at formerly [facility] drive-up clinic.  Pt denies any sort of medical distress today.  After identifying the patient, a nasopharyngeal sample was obtained and placed in viral transport medium.  Pt was given a post-collection education sheet and encouraged to self-isolate until they receive the results.  Pt directed to [facility] website for [facility] Privacy Practices.  Sample sent to the lab noted on the order and requisition.  Pt was without additional questions or concerns and was dispositioned from the testing area to home.

## 2020-12-14 ENCOUNTER — TELEPHONE (OUTPATIENT)
Dept: OTOLARYNGOLOGY | Facility: CLINIC | Age: 30
End: 2020-12-14

## 2020-12-14 NOTE — TELEPHONE ENCOUNTER
Please call the sleep lab.  It has been 3 weeks since the patient underwent her sleep study and I still do not have a result.

## 2020-12-15 ENCOUNTER — TELEPHONE (OUTPATIENT)
Dept: OTOLARYNGOLOGY | Facility: CLINIC | Age: 30
End: 2020-12-15

## 2020-12-15 NOTE — TELEPHONE ENCOUNTER
Please let the patient know that I finally got the final results on her sleep study.  She does not have sleep apnea.  She just has snoring.  She should meet with a sleep specialist since she has so many complaints about the quality of her sleep.  I would recommend Dr. Rochelle Goldberg from [facility] and [facility] at [facility].  Also find a sleep specialist in the city.  I am just not familiar with these doctors there

## 2021-01-10 ENCOUNTER — IMMUNIZATION (OUTPATIENT)
Dept: IMMUNIZATION | Facility: CLINIC | Age: 31
End: 2021-01-10
Attending: FAMILY MEDICINE

## 2021-01-10 ENCOUNTER — TRANSCRIBE ORDERS (OUTPATIENT)
Dept: INTERNAL MEDICINE | Facility: CLINIC | Age: 31
End: 2021-01-10

## 2021-01-21 ENCOUNTER — OFFICE VISIT (OUTPATIENT)
Dept: PULMONOLOGY | Facility: CLINIC | Age: 31
End: 2021-01-21
Payer: COMMERCIAL

## 2021-01-21 VITALS
HEIGHT: 66 IN | BODY MASS INDEX: 22.02 KG/M2 | DIASTOLIC BLOOD PRESSURE: 70 MMHG | SYSTOLIC BLOOD PRESSURE: 118 MMHG | HEART RATE: 93 BPM | RESPIRATION RATE: 16 BRPM | WEIGHT: 137 LBS | OXYGEN SATURATION: 98 %

## 2021-01-21 DIAGNOSIS — J30.9 ALLERGIC RHINITIS, UNSPECIFIED SEASONALITY, UNSPECIFIED TRIGGER: ICD-10-CM

## 2021-01-21 DIAGNOSIS — J45.21 MILD INTERMITTENT ASTHMA WITH ACUTE EXACERBATION: Primary | ICD-10-CM

## 2021-01-21 DIAGNOSIS — I10 HYPERTENSION, UNSPECIFIED TYPE: ICD-10-CM

## 2021-01-21 PROCEDURE — 99244 OFF/OP CNSLTJ NEW/EST MOD 40: CPT | Performed by: INTERNAL MEDICINE

## 2021-01-21 PROCEDURE — 94375 RESPIRATORY FLOW VOLUME LOOP: CPT | Performed by: INTERNAL MEDICINE

## 2021-01-21 NOTE — PROGRESS NOTES
Dear Dr. Trujillo, Yadira CORTÉS MD,    Thank you for the opportunity to evaluate your patient Mona Traylor a 30 y.o.  female who is seen today in the office for complaints of cough, chest tightness and dyspnea.      History of Present Illness:  Mona is a delightful 30-year-old female with no history of primary or secondhand tobacco exposure with a significant past medical history of hypertension, migraines, depression/anxiety, vertigo, allergic rhinitis as well as an asthmatic syndrome diagnosed at age 26.  Her asthma symptoms have been intermittent with long periods of asymptomatic stretches.  Typical symptoms include cough, chest tightness and dyspnea.  Identifiable triggers of her asthma include upper respiratory tract infections, possibly cleaning agents and she is as well as exhaust fumes.  She is typically worse in the fall or winter.She  uses  long-acting bronchodilating agents with ICS and will occasionally require courses of prednisone and Z-Yuniel. She also has significant chronic nasal congestion and postnasal drip.      She was essentially well until March 2020 when she had a viral syndrome with exacerbation of her symptoms.  This improved and she was largely asymptomatic until December 2020 when she developed an upper respiratory tract infection.  Covid testing was negative.  She has had persistent episodes of cough, chest tightness and dyspnea despite a regimen of prednisone and Z-Yuniel and regular use of long-acting bronchodilating agents and ICS.  She continues to have significant nasal congestion and postnasal drip.  She has had difficulty using Flonase or Astelin secondary to nasal dryness and bleeding.  She has had no dyspepsia, dysphagia or dysphonia.  She denies chest pain, leg pain or swelling, palpitations, dizziness or syncope.  She denies fever, chills, night sweats, weight change or constitutional symptoms.  She does snore and sleeps poorly but recent sleep study showed no evidence of obstructive  sleep apnea.    She has no known inhalation, occupational or recreational exposures.  She did not have respiratory problems during infancy or childhood.  There is no known family history of pulmonary disease including asthma.  She does not live with pets and environmental survey is essentially negative.  She does suspect that allergies include dusts and molds.  He has not required prolonged mechanical ventilatory support and typically has not been on chronic steroid agents.  There is no history of connective tissue disease.    Past Medical History:   Diagnosis Date   • Anxiety    • Asthma    • Depression    • Hypertension    • Tinnitus      Past Surgical History:   Procedure Laterality Date   • CERVICAL BIOPSY  W/ LOOP ELECTRODE EXCISION     • MENISCECTOMY Left    • WISDOM TOOTH EXTRACTION       Allergies   Allergen Reactions   • Gadoterate Meglumine Hives and Itching     Patient received 20mL of Dotarem for an MRI exam.  Upon finishing exam, patient experienced hives, redness, and itching on her chest.  Patient was examined by Kevan (nurse) and Dr. Chicho Aldridge (neuroradiologist).  Patient was given 25mg of PO Benadryl, and OK'd to leave.  Patient must have steroid prep for all future MR contrast exams per Dr. Aldridge. CH   • Reglan [Metoclopramide Hcl]      Dystonic   • Penicillins Rash   • Sulfa (Sulfonamide Antibiotics) Rash     Social History     Socioeconomic History   • Marital status: Single     Spouse name: None   • Number of children: None   • Years of education: None   • Highest education level: None   Occupational History   • Occupation:      Comment: [employer]   Social Needs   • Financial resource strain: None   • Food insecurity     Worry: None     Inability: None   • Transportation needs     Medical: None     Non-medical: None   Tobacco Use   • Smoking status: Never Smoker   • Smokeless tobacco: Never Used   Substance and Sexual Activity   • Alcohol use: Yes   • Drug use:  None   • Sexual activity: None   Lifestyle   • Physical activity     Days per week: None     Minutes per session: None   • Stress: None   Relationships   • Social connections     Talks on phone: None     Gets together: None     Attends [employer] service: None     Active member of club or organization: None     Attends meetings of clubs or organizations: None     Relationship status: None   • Intimate partner violence     Fear of current or ex partner: None     Emotionally abused: None     Physically abused: None     Forced sexual activity: None   Other Topics Concern   • None   Social History Narrative   • None     Family History   Problem Relation Age of Onset   • No Known Problems Biological Mother    • Heart failure Biological Father    • ADD / ADHD Biological Brother        Current Outpatient Medications:   •  buPROPion XL (WELLBUTRIN XL) 300 mg 24 hr tablet, , Disp: , Rfl:   •  TIFF 0.35 mg tablet, , Disp: , Rfl:   •  escitalopram (LEXAPRO) 20 mg tablet, , Disp: , Rfl:   •  fluocinolone acetonide oiL 0.01 % drops, 2 drops into each ear at night for 2 weeks and then stop., Disp: 20 mL, Rfl: 4  •  LORazepam (ATIVAN) 0.5 mg tablet, Take 0.5 mg by mouth 2 (two) times a day as needed., Disp: , Rfl: 0  •  montelukast (SINGULAIR) 10 mg tablet, , Disp: , Rfl:   •  ondansetron ODT (ZOFRAN-ODT) 4 mg disintegrating tablet, 1 tablet on tongue -allow to dissolve - every 12 hours for nausea/vomiting, Disp: 60 tablet, Rfl: 3  •  polyethylene glycol (MIRALAX) 17 gram packet, Take 17 g by mouth daily., Disp: , Rfl:   •  TOPROL XL 50 mg 24 hr tablet, , Disp: , Rfl:   •  triamterene-hydrochlorothiazid (DYAZIDE) 37.5-25 mg per capsule, 1 capsule daily, Disp: 90 capsule, Rfl: 3  Immunization History   Administered Date(s) Administered   • Sars-cov-2 (Covid-19) Vaccine, Moderna 01/10/2021     Immunization status:  stated as current, but no records available.    Review of Systems:  A Full 14 point review of systems was obtained and  "is negative then otherwise stated in the HPI.      Physical Examination:  This is a well-developed well-nourished 30 y.o.year old female in no acute distress.  Vital Signs:   Visit Vitals  /70 (BP Location: Left upper arm, Patient Position: Sitting)   Pulse 93   Resp 16   Ht 1.676 m (5' 6\")   Wt 62.1 kg (137 lb)   SpO2 98% Comment: Room air   BMI 22.11 kg/m²     HEENT:  Normocephalic, atraumatic.  PERRLA. There is mild nasal mucosal inflammation with mild pharyngitis.  There is no significant crowding of the posterior oropharynx.  Eyes:  Sclera anicteric, conjunctiva pink, pupils equal and reactive to light  Neck: no adenopathy, no JVD.  Chest: Mechanics are normal.  Sounds are essentially clear with some expiratory delay and a very fine bilateral expiratory wheeze.  Cor:  RR, normal S1 and S2, no murmurs, gallops or rubs.  There is no accentuated P2 or right ventricular heave.  Abd: Soft and  nontender, nondistended, normal bowel sounds.  There is no hepatosplenomegaly.  Extremities: no clubbing, cyanosis or edema.  Skin: no rash, jaundice or petechiae.  Neuro: alert and oriented, no focal deficits.  Lymph nodes:There is no palpable peripheral lymphadenopathy.  Joints: no deformities, no warmth or erythema.  Psych: normal affect, good eye contact.      Diagnostic Data:      Labs:  I have personally reviewed all available pertinent patient laboratory results. Labs of note discussed below:  ABG Results     No lab values to display.        CBC Results     No lab values to display.        BMP Results     No lab values to display.          PFT: Spirometric data performed today is good technical study.  Spirograms tail.  Forced vital capacity 4.67 L (116% predicted).  FEV1 3.10 L (91% predicted).  FEV1 percent 66%.  FEF 25 to 75%, 110% predicted.  Flow volume loop show some concavity in the expiratory limb.  Impression: This study reveals mild airflow limitation.  No previous studies are available for " comparison.    Imaging:  I have independently reviewed all available pertinent imaging.      Assessment:   #1.  This seems to be consistent with an asthmatic syndrome previously triggered by a viral upper respiratory tract infection with consistent with but not necessarily typical symptomatology and indeed associated with mild airflow limitation.  There is evidence of significant nasal congestion and postnasal drip clearly potentially triggering lower airways inflammation and bronchospasm as well.  Mechanics of breathing are normal and there is no evidence of acute or chronic hypoxemia or pulmonary hypertension.  To the extent that any degree of anxiety is contributing to her symptoms is presently unclear.  It is not unusual for postinfectious hyperreactive airway syndromes to continue for between 6 to 12 weeks until respiratory epithelium regenerates and heals.  However, I am optimistic that the use of bronchodilating agents and ICS as well as control of her upper airway will significantly improve her symptoms.    #2.  Rhinitis/postnasal drip; clearly contributing to lower airways inflammation and bronchospasm.    Plan:  #1.  At this juncture I would recommend a trial of dual, long-acting bronchodilating agents including the addition of an inhaled LAMA.  #2.  Specifically I have started Breztri 2 puffs twice daily rinsing and gargling well after use.  I have personally instructed her in the proper use of the inhaler, given her samples a prescription and coupons  #3.  I have asked her to resume Flonase Sensimist 2 squirts in each nostril at bedtime and have instructed her in the proper use of the nasal device.  Have also asked her to use saline nasal spray routinely throughout the day.  #4.  I have asked her to try to identify all triggers and patterns of her symptoms and not only practice avoidance therapy but to discuss this with me at her next appointment.  #5.  In general, if she finds that she needs rescue  inhaler more than once or twice per week, not getting prompted sustained response and at the first sign of a URI she is to initiate bronchodilating agents and ICS.  #6.  We have discussed the importance of control of her upper airway relative to its impact on her asthma.  #7.  If there is not significant improvement on this regimen then I would proceed with bronchoprovocative testing.  #8.  Otherwise continue the present plan for now.    I have discussed my findings concerns and recommendations at length with the patient.  All questions were asked and answered. Education and instructions have been provided. Follow-up has been arranged.  I will keep you apprised of my findings.  Please let me know if you have any questions.  Many thanks. Best regards.     Sincerely,    Ethan Arguello MD    1/21/2021    This letter was generated using speech recognition software.  Please excuse any typographical errors.

## 2021-02-12 ENCOUNTER — IMMUNIZATION (OUTPATIENT)
Dept: IMMUNIZATION | Facility: CLINIC | Age: 31
End: 2021-02-12

## 2021-02-23 ENCOUNTER — OFFICE VISIT (OUTPATIENT)
Dept: PULMONOLOGY | Facility: CLINIC | Age: 31
End: 2021-02-23
Payer: COMMERCIAL

## 2021-02-23 VITALS
HEIGHT: 66 IN | BODY MASS INDEX: 22.02 KG/M2 | RESPIRATION RATE: 16 BRPM | SYSTOLIC BLOOD PRESSURE: 118 MMHG | HEART RATE: 82 BPM | OXYGEN SATURATION: 98 % | WEIGHT: 137 LBS | DIASTOLIC BLOOD PRESSURE: 72 MMHG

## 2021-02-23 DIAGNOSIS — I10 HYPERTENSION, UNSPECIFIED TYPE: ICD-10-CM

## 2021-02-23 DIAGNOSIS — J30.9 ALLERGIC RHINITIS, UNSPECIFIED SEASONALITY, UNSPECIFIED TRIGGER: ICD-10-CM

## 2021-02-23 DIAGNOSIS — J45.21 MILD INTERMITTENT ASTHMA WITH ACUTE EXACERBATION: Primary | ICD-10-CM

## 2021-02-23 PROCEDURE — 99214 OFFICE O/P EST MOD 30 MIN: CPT | Performed by: INTERNAL MEDICINE

## 2021-02-23 PROCEDURE — 94375 RESPIRATORY FLOW VOLUME LOOP: CPT | Performed by: INTERNAL MEDICINE

## 2021-02-23 RX ORDER — BUDESONIDE, GLYCOPYRROLATE, AND FORMOTEROL FUMARATE 160; 9; 4.8 UG/1; UG/1; UG/1
AEROSOL, METERED RESPIRATORY (INHALATION)
COMMUNITY
Start: 2021-02-01 | End: 2022-04-11 | Stop reason: ALTCHOICE

## 2021-02-23 RX ORDER — CETIRIZINE HYDROCHLORIDE 10 MG/1
TABLET ORAL
COMMUNITY

## 2021-02-23 NOTE — PROGRESS NOTES
Pulmonary Evaluation         Dear Dr. Trujillo, Yadira CORTÉS MD,      Thank you for the opportunity to reevaluate your patient Mona Traylor a 30 y.o.  female who is seen today in the office for follow-up.    History of Present Illness:  I am pleased to report that Mona is doing extremely well on the present regimen of dual, long-acting bronchodilating agents and ICS with no sustained coughing, wheezing, chest tightness or dyspnea.  Her upper airway is better controlled.  She has had no dyspepsia, dysphagia or dysphonia.  She has had no chest pain, leg pain or swelling, palpitations, dizziness or syncope.  She denies fever, chills, night sweats, weight change or constitutional symptoms.  She has had no nocturnal respiratory complaints.    Identifiable triggers of her asthma include upper respiratory tract infections, allergic rhinitis, exposure to bronchial irritants including cleaning agents, exercise and exhaust fumes.  She is typically worse in the fall and winter.She has no known inhalation, occupational or recreational exposures.  She did not have respiratory problems during infancy or childhood.  There is no known family history of pulmonary disease including asthma.  She does not live with pets and environmental survey is essentially negative.  She does suspect that allergies include dusts and molds.  She has not required prolonged mechanical ventilatory support and typically has not been on chronic steroid agents.  There is no history of connective tissue disease.  She has no history of primary or secondhand tobacco exposure.      Past Medical History:   Diagnosis Date   • Anxiety    • Asthma    • Depression    • Hypertension    • Tinnitus      Past Surgical History:   Procedure Laterality Date   • CERVICAL BIOPSY  W/ LOOP ELECTRODE EXCISION     • MENISCECTOMY Left    • WISDOM TOOTH EXTRACTION       Allergies   Allergen Reactions   • Gadoterate Meglumine Hives and Itching     Patient received 20mL of  Dotarem for an MRI exam.  Upon finishing exam, patient experienced hives, redness, and itching on her chest.  Patient was examined by Kevan (nurse) and Dr. Chicho Aldridge (neuroradiologist).  Patient was given 25mg of PO Benadryl, and OK'd to leave.  Patient must have steroid prep for all future MR contrast exams per Dr. Aldridge. CH   • Reglan [Metoclopramide Hcl]      Dystonic   • Penicillins Rash   • Sulfa (Sulfonamide Antibiotics) Rash     Social History     Socioeconomic History   • Marital status: Single     Spouse name: None   • Number of children: None   • Years of education: None   • Highest education level: None   Occupational History   • Occupation:      Comment: [employer]   Social Needs   • Financial resource strain: None   • Food insecurity     Worry: None     Inability: None   • Transportation needs     Medical: None     Non-medical: None   Tobacco Use   • Smoking status: Never Smoker   • Smokeless tobacco: Never Used   Substance and Sexual Activity   • Alcohol use: Yes   • Drug use: None   • Sexual activity: None   Lifestyle   • Physical activity     Days per week: None     Minutes per session: None   • Stress: None   Relationships   • Social connections     Talks on phone: None     Gets together: None     Attends Baptism service: None     Active member of club or organization: None     Attends meetings of clubs or organizations: None     Relationship status: None   • Intimate partner violence     Fear of current or ex partner: None     Emotionally abused: None     Physically abused: None     Forced sexual activity: None   Other Topics Concern   • None   Social History Narrative   • None     Family History   Problem Relation Age of Onset   • No Known Problems Biological Mother    • Heart failure Biological Father    • ADD / ADHD Biological Brother        Current Outpatient Medications:   •  BREZTRI AEROSPHERE 160-9-4.8 mcg/actuation HFA aerosol inhaler, TAKE 2 PUFFS BY MOUTH  "TWICE A DAY, Disp: , Rfl:   •  buPROPion XL (WELLBUTRIN XL) 300 mg 24 hr tablet, , Disp: , Rfl:   •  TIFF 0.35 mg tablet, , Disp: , Rfl:   •  cetirizine (ZyrTEC) 10 mg tablet, , Disp: , Rfl:   •  escitalopram (LEXAPRO) 20 mg tablet, , Disp: , Rfl:   •  fluocinolone acetonide oiL 0.01 % drops, 2 drops into each ear at night for 2 weeks and then stop., Disp: 20 mL, Rfl: 4  •  LORazepam (ATIVAN) 0.5 mg tablet, Take 0.5 mg by mouth 2 (two) times a day as needed., Disp: , Rfl: 0  •  montelukast (SINGULAIR) 10 mg tablet, , Disp: , Rfl:   •  ondansetron ODT (ZOFRAN-ODT) 4 mg disintegrating tablet, 1 tablet on tongue -allow to dissolve - every 12 hours for nausea/vomiting, Disp: 60 tablet, Rfl: 3  •  polyethylene glycol (MIRALAX) 17 gram packet, Take 17 g by mouth daily., Disp: , Rfl:   •  TOPROL XL 50 mg 24 hr tablet, , Disp: , Rfl:   •  triamterene-hydrochlorothiazid (DYAZIDE) 37.5-25 mg per capsule, 1 capsule daily, Disp: 90 capsule, Rfl: 3  Immunization History   Administered Date(s) Administered   • Sars-cov-2 (Covid-19) Vaccine, Moderna 01/10/2021, 02/12/2021     Immunization status: stated as current, but no records available.      Review of Systems:  A Full 14  point review of systems was obtained and is negative or otherwise stated in the HPI.      Physical Examination:  This is a well-developed well-nourished 30 y.o.year old female in no acute distress.  Vital Signs:   Visit Vitals  /72   Pulse 82   Resp 16   Ht 1.676 m (5' 6\")   Wt 62.1 kg (137 lb)   SpO2 98% Comment: Room air   BMI 22.11 kg/m²     HEENT:  Normocephalic, atraumatic.  PERRLA. There is no nasal mucosal inflammation or pharyngitis.  Eyes:  Sclera anicteric, conjunctiva pink, pupils equal and reactive to light  Neck: no adenopathy, no JVD  Chest:  Lungs clear to auscultation and percussion bilaterally, no crackles, rubs, rhonchi or wheezing.  Cor:  RR, normal S1 and S2, no murmurs, gallops or rubs.   There is no accentuated P2 or right " ventricular heave.  Abd: Soft Nontender, nondistended, normal bowel sounds.  There is no hepatosplenomegaly.  Extremities: no clubbing, cyanosis or edema.  Skin: no rash, jaundice or petechiae.  The skin is otherwise intact.  Neuro: alert and oriented with no focal deficits.  Lymph nodes:There is no palpable peripheral  lymphadenopathy.  Joints: no deformities, no warmth or erythema.  Psych: normal affect, good eye contact.      Diagnostic Data:      Labs:  I have personally reviewed all pertinent patient laboratory results.  ABG Results     No lab values to display.        CBC Results     No lab values to display.        BMP Results     No lab values to display.        PFT: Spirometric data performed today is good technical study.  Spirograms reach plateau.  Forced vital capacity 4.82 L (120% predicted).  FEV1 4.28 L (127% predicted).  FEV1 percent 89%.  FEF 25 to 75%, 151% predicted.  Flow volume loops are normal.  Impression: This is a normal study with complete resolution of the previously noted airflow limitation.      Imaging:  I have independently reviewed all available pertinent imaging.      Assessment:   #1.  Asthmatic syndrome with consistent constellation of symptoms and reversible airflow limitation.  She is status post exacerbation triggered by viral URI with resolution of symptoms and almost a 30% improvement in FEV1 on dual, long-acting bronchodilating agents ICS and intranasal steroids.  She has had no sequela or side effects from medical regimen.    #2.  Rhinitis/postnasal drip; better controlled but clearly a trigger of lower airways inflammation and bronchospasm.      Plan:  #1.  At this juncture I would continue dual, long-acting bronchodilating agents for a couple more weeks and then taper and discontinue.  #2.  I have asked her to use Flonase Sensimist and liberal saline nasal spray for control of her upper airway.  #3.  We have discussed the importance of identifying all triggers and patterns  of her symptoms and practicing avoidance therapy were at all possible.  #4.  If she finds that she needs rescue inhaler more than once or twice per week and/or is not getting prompted sustained response have asked her to make sure that her long-acting bronchodilating agents and ICS are optimized and then to give us a call.  #5.  I have also asked her to optimize dual, long-acting bronchodilating agents and ICS at the first sign of a URI.  She should continue this for at least 2 weeks after her URI symptoms have resolved.  #6.  Otherwise continue the present plan for now.    I have discussed my findings concerns and recommendations at length with Mona.  All questions were asked and answered. Evaluation of supplies and equipment were performed. Education and instructions have been provided.  Follow-up will be arranged as needed.  I will keep you apprised of my findings.  Please let me know if you have any questions.  Many thanks. Best regards.    Sincerely,    Ethan Arguello MD    2/23/2021    This letter was generated using speech recognition software.  Please excuse any typographical errors.

## 2021-03-22 ENCOUNTER — PROCEDURE VISIT (OUTPATIENT)
Dept: OTOLARYNGOLOGY | Facility: CLINIC | Age: 31
End: 2021-03-22
Payer: COMMERCIAL

## 2021-03-22 ENCOUNTER — OFFICE VISIT (OUTPATIENT)
Dept: OTOLARYNGOLOGY | Facility: CLINIC | Age: 31
End: 2021-03-22
Payer: COMMERCIAL

## 2021-03-22 VITALS
OXYGEN SATURATION: 98 % | DIASTOLIC BLOOD PRESSURE: 60 MMHG | WEIGHT: 135 LBS | TEMPERATURE: 96.3 F | BODY MASS INDEX: 21.69 KG/M2 | SYSTOLIC BLOOD PRESSURE: 100 MMHG | HEART RATE: 80 BPM | HEIGHT: 66 IN

## 2021-03-22 DIAGNOSIS — H81.01 ACTIVE COCHLEAR MENIERE'S DISEASE OF RIGHT EAR: Primary | ICD-10-CM

## 2021-03-22 DIAGNOSIS — J30.1 SEASONAL ALLERGIC RHINITIS DUE TO POLLEN: ICD-10-CM

## 2021-03-22 DIAGNOSIS — H90.41 SENSORINEURAL HEARING LOSS (SNHL) OF RIGHT EAR WITH UNRESTRICTED HEARING OF LEFT EAR: ICD-10-CM

## 2021-03-22 DIAGNOSIS — H69.91 DYSFUNCTION OF RIGHT EUSTACHIAN TUBE: ICD-10-CM

## 2021-03-22 PROCEDURE — 99999 PR OFFICE/OUTPT VISIT,PROCEDURE ONLY: CPT | Performed by: AUDIOLOGIST

## 2021-03-22 PROCEDURE — 92557 COMPREHENSIVE HEARING TEST: CPT | Performed by: AUDIOLOGIST

## 2021-03-22 PROCEDURE — 92567 TYMPANOMETRY: CPT | Performed by: AUDIOLOGIST

## 2021-03-22 PROCEDURE — 99214 OFFICE O/P EST MOD 30 MIN: CPT | Mod: 25 | Performed by: OTOLARYNGOLOGY

## 2021-03-22 ASSESSMENT — ENCOUNTER SYMPTOMS
SINUS PRESSURE: 0
FREQUENCY: 0
DIARRHEA: 0
CONSTIPATION: 0
RHINORRHEA: 0
MYALGIAS: 0
DIZZINESS: 1
PALPITATIONS: 0
ARTHRALGIAS: 0
FATIGUE: 0
NAUSEA: 0
WEAKNESS: 0
DYSPHORIC MOOD: 0
HEADACHES: 0
NERVOUS/ANXIOUS: 0
SLEEP DISTURBANCE: 0
FEVER: 0
SHORTNESS OF BREATH: 0
WHEEZING: 0
COUGH: 0
BACK PAIN: 0
VOMITING: 0
TROUBLE SWALLOWING: 0
VOICE CHANGE: 0
POLYPHAGIA: 0
ADENOPATHY: 0

## 2021-03-22 NOTE — PATIENT INSTRUCTIONS
1.  Simply Saline, 2 squirts to each nostril twice daily.  2.  Flonase Sensimist, 1 squirt to each nostril twice daily.  3.  Dyazide, 1 tablet daily for 4 days then take 1 day off and continue this cycle.  4.  Drink plenty of water, at least 48 ounces per day.  5.  Habituation exercises twice daily.  6.  Let me know if you want to return to vestibular therapy.  7.  Follow-up in September.

## 2021-03-22 NOTE — PROGRESS NOTES
[facility]  [address], [address]  [place] [address]  Phone: [phone]  Fax: [phone]      Patient ID: Mona Traylor                              : 1990    Visit Date: 3/22/2021  Referring Provider: Yadira Trujillo MD    Chief Complaint: Meniere's Disease      Mona Traylor returned to the [place] office today in regard to her history of right atypical Ménière's disease.    I last saw Mona in September.  Since then she is done reasonably well but she does have a few symptoms that are bothersome.  These have included intermittent fullness in the right ear with episodic pressure although no change in hearing has been noted.  She has not had vertigo nor any real dizziness, but she does feel uncomfortable if she bends over and stands up quickly and when she turns her head from side to side rapidly at work.  She has not been performing vestibular exercises at home although she knows that these would be helpful because they have helped in the past.    She was recently placed on a new inhaler by Dr. Arguello.He also suggested that she increase her Flonase to 2 squirts to each nostril daily.  She has done this but the right ear has not really opened up as of yet.  She is also using saline nasal spray.  She continues to take the Dyazide 3 days on and 1 day off and wonders if she should try to increase this again in hopes that the ear symptoms would quiet back down.  Her blood pressure has remained stable.    Mona has used meclizine and Zofran just twice in 6 months.      Review of Systems   Constitutional: Negative for fatigue and fever.   HENT: Positive for congestion, postnasal drip and tinnitus. Negative for hearing loss, nosebleeds, rhinorrhea, sinus pressure, trouble swallowing and voice change.         Right ear fullness.   Eyes: Negative for visual disturbance.   Respiratory: Negative for cough, shortness of breath and wheezing.    Cardiovascular: Negative for chest pain and  "palpitations.   Gastrointestinal: Negative for constipation, diarrhea, nausea and vomiting.   Endocrine: Negative for polyphagia and polyuria.   Genitourinary: Negative for frequency.   Musculoskeletal: Negative for arthralgias, back pain, gait problem and myalgias.   Skin: Negative for rash.   Allergic/Immunologic: Negative for environmental allergies.   Neurological: Positive for dizziness. Negative for weakness and headaches.   Hematological: Negative for adenopathy.   Psychiatric/Behavioral: Negative for dysphoric mood and sleep disturbance. The patient is not nervous/anxious.        Current Medications: Breztri aerosphere, bupropion xl, jeremi, cetirizine, escitalopram, lorazepam, montelukast, ondansetron odt, polyethylene glycol, toprol xl, and triamterene-hydrochlorothiazid.    Physical Exam:  Vitals:   Visit Vitals  /60 (BP Location: Left upper arm, Patient Position: Sitting)   Pulse 80   Temp (!) 35.7 °C (96.3 °F) (Temporal)   Ht 1.676 m (5' 6\")   Wt 61.2 kg (135 lb)   SpO2 98%   BMI 21.79 kg/m²     General:  Well-developed, well-nourished 31 y.o. who is in no acute distress.  Voice: Normal without hoarseness, breathiness or stridor.  Head/face:  No scars or lesions.  No parotid masses. No presinus tenderness. Seventh cranial nerves intact.  Eyes:  Extraocular movements and gaze alignment normal.  No spontaneous or inducible nystagmus  Ears: Auricles normal.  External auditory canals free of cerumen.  Tympanic membranes clear, mobile and without retraction or scar.  Nose:  Dorsum straight.  Septum sinusoidal without obstruction.  Turbinates purple and edematous but normal in size and orientation.  No pus, polyps or crusts.  Oral Cavity/oropharynx: Normal tongue thrust. Normal gag reflex. No masses, leukoplakia, erythroplakia, ulcers or other abnormalities at the tongue, floor of mouth, buccal mucosa, palate or posterior pharyngeal wall.  Larynx/hypopharynx: Examination deferred due to the " pandemic.  Neck:  No masses, adenopathy, cervical spasm or thyroid enlargement.  Cranial Nerves II through XII: Grossly intact.  Mental status: Awake, alert and oriented ×3.  No anxiety or depression.    Audiogram: Normal hearing across all frequencies on the left.  Mild sensorineural hearing loss above 4000 Hz on the right.  Pure-tone disparity of 5-20 dB right worse than left above 1000 Hz.  Rumination scores: 100% of 55 dB on the right, 100% at 45 dB on the left.    Tympanogram: Negative pressure on the right.  Normal on the left    Impression:  1.  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  2.  Right high-frequency sensorineural hearing loss, stable.  3.  Right eustachian tube dysfunction.  4.  Allergic rhinitis.    Recommendations and Plan:  1.  Simply Saline, 2 squirts to each nostril twice daily.  2.  Flonase Sensimist, 1 squirt to each nostril twice daily.  3.  Dyazide, 1 tablet daily for 4 days then take 1 day off and continue this cycle.  4.  Drink plenty of water, at least 48 ounces per day.  5.  Habituation exercises twice daily.  6.  Let me know if you want to return to vestibular therapy.  7.  Follow-up in September.        Krissy Hunter MD

## 2021-08-02 ENCOUNTER — TELEPHONE (OUTPATIENT)
Dept: OTOLARYNGOLOGY | Facility: CLINIC | Age: 31
End: 2021-08-02

## 2021-08-03 RX ORDER — PREDNISONE 10 MG/1
TABLET ORAL
Qty: 25 TABLET | Refills: 0 | Status: SHIPPED | OUTPATIENT
Start: 2021-08-03 | End: 2021-10-11 | Stop reason: ALTCHOICE

## 2021-08-03 NOTE — TELEPHONE ENCOUNTER
Pt is requesting a telephone call to discuss Meniere's flare up at your earliest convenience .Please advise

## 2021-08-03 NOTE — TELEPHONE ENCOUNTER
"The patient is having severe hyperacusis with fullness in the ear as well as the electrical symptoms with \"shock\" sensations in the head, in the tongue and arms.    We are going to treat the nose with Flonase, Singulair and saline.  The Meniere's flare is being treated with prednisone and increased diuretic.    I encouraged the patient to get a comment from [facility] and to see a Neurologist, perhaps at [facility] in regard to the paresthesias because these are not relational to the Ménière's disease.  "

## 2021-10-11 ENCOUNTER — PROCEDURE VISIT (OUTPATIENT)
Dept: OTOLARYNGOLOGY | Facility: CLINIC | Age: 31
End: 2021-10-11
Payer: COMMERCIAL

## 2021-10-11 ENCOUNTER — OFFICE VISIT (OUTPATIENT)
Dept: OTOLARYNGOLOGY | Facility: CLINIC | Age: 31
End: 2021-10-11
Payer: COMMERCIAL

## 2021-10-11 VITALS
WEIGHT: 135 LBS | HEIGHT: 66 IN | DIASTOLIC BLOOD PRESSURE: 70 MMHG | BODY MASS INDEX: 21.69 KG/M2 | HEART RATE: 86 BPM | SYSTOLIC BLOOD PRESSURE: 90 MMHG | OXYGEN SATURATION: 100 %

## 2021-10-11 DIAGNOSIS — H81.01 ACTIVE COCHLEAR MENIERE'S DISEASE OF RIGHT EAR: Primary | ICD-10-CM

## 2021-10-11 DIAGNOSIS — J30.1 SEASONAL ALLERGIC RHINITIS DUE TO POLLEN: ICD-10-CM

## 2021-10-11 DIAGNOSIS — G43.809 VESTIBULAR MIGRAINE: ICD-10-CM

## 2021-10-11 DIAGNOSIS — H90.41 SENSORINEURAL HEARING LOSS (SNHL) OF RIGHT EAR WITH UNRESTRICTED HEARING OF LEFT EAR: ICD-10-CM

## 2021-10-11 DIAGNOSIS — R09.82 POST-NASAL DRIP: ICD-10-CM

## 2021-10-11 DIAGNOSIS — H81.01 MENIERE'S DISEASE OF RIGHT EAR: Primary | ICD-10-CM

## 2021-10-11 PROCEDURE — 99999 PR OFFICE/OUTPT VISIT,PROCEDURE ONLY: CPT | Performed by: AUDIOLOGIST

## 2021-10-11 PROCEDURE — 92567 TYMPANOMETRY: CPT | Performed by: AUDIOLOGIST

## 2021-10-11 PROCEDURE — 92557 COMPREHENSIVE HEARING TEST: CPT | Performed by: AUDIOLOGIST

## 2021-10-11 PROCEDURE — 3008F BODY MASS INDEX DOCD: CPT | Performed by: OTOLARYNGOLOGY

## 2021-10-11 PROCEDURE — 99214 OFFICE O/P EST MOD 30 MIN: CPT | Mod: 25 | Performed by: OTOLARYNGOLOGY

## 2021-10-11 RX ORDER — METOPROLOL TARTRATE 25 MG/1
25 TABLET, FILM COATED ORAL DAILY
COMMUNITY
End: 2022-04-08

## 2021-10-11 ASSESSMENT — ENCOUNTER SYMPTOMS
TROUBLE SWALLOWING: 0
WEAKNESS: 0
VOICE CHANGE: 0
SLEEP DISTURBANCE: 0
WHEEZING: 0
FEVER: 0
DIZZINESS: 0
ADENOPATHY: 0
BACK PAIN: 0
COUGH: 0
SHORTNESS OF BREATH: 0
HEADACHES: 1
SINUS PRESSURE: 0
DIARRHEA: 0
VOMITING: 0
ARTHRALGIAS: 0
RHINORRHEA: 0
DYSPHORIC MOOD: 0
NERVOUS/ANXIOUS: 0
FREQUENCY: 0
FATIGUE: 0
MYALGIAS: 0
POLYPHAGIA: 0
PALPITATIONS: 0
CONSTIPATION: 0
NAUSEA: 0

## 2021-10-11 NOTE — PATIENT INSTRUCTIONS
"1.  Decrease Dyazide to 2 days on and 1 day off for 1 month.  If this is well-tolerated, decrease to every other day dosing.  2.  Simply Saline, 2 squirts to each nostril twice daily.  3.  Zyrtec, 10 mg each evening.  4.  Montelukast, 10 mg each evening.  5.  If the episodes of headache and \"shocks\" continue, please contact the neurologist.  You may want to start a prophylactic medication.  6.  B complex vitamin, 1 daily.  7.  Magnesium, 400 mg daily.  8.  Continue to hydrate yourself well.  9.  Follow-up in 6 months.  "

## 2021-10-11 NOTE — PROGRESS NOTES
"    [facility]  [address], [address]  [place] [address]  Phone: [phone]  Fax: [phone]      Patient ID: Mona Traylor                              : 1990    Visit Date: 10/11/2021  Referring Provider: Yadira Oseguera MD    Chief Complaint: Right Ménière's disease      Mona Traylor returned to the [place] office today in regard to her history of atypical right Ménière's syndrome with high-frequency sensorineural hearing loss right eustachian tube dysfunction and migraine headaches.    I last saw Mona in March. At that time she had normal hearing on the left and mild sensorineural hearing loss above 4000 Hz on the right. Today's testing is similar. Her discrimination scores remain excellent at 100% on the left and 96% on the right. She still has mild negative pressure in the right middle ear as well.  At present, these findings are consistent with Mona's clinical experience.  She is not vertiginous and the right ear is not full nor is it ringing loudly.  However, in early October, she contacted me in regard to a flareup of fullness, vertigo, popping and tinnitus associated with episodes of \"shooting electric shocks\" in the tip of her tongue and episodic headaches.  I placed her on a course of prednisone and this definitely helped.      She also saw a neurologist at [facility] in regard to all of these symptoms.  It is his feeling that she primarily has migraine headaches triggered by cervical spasm and that the ear symptoms probably also represent a crossover between Ménière's syndrome and vestibular migraine.  I certainly agree.  He offered both prophylactic and abortive migraine medicine, but at the time, case he was feeling better and so did not take the prescriptions.  Unfortunately, she has been experiencing daily headaches for about the past 2-1/2 weeks.  Her usual regimen of Excedrin Migraine and increased fluids has not eliminated the daily headache.  I encouraged her to call " "the neurologist to obtain the medications that he had recommended in the first place.    Mona has also had some difficulties with the diuretic in the sense that it is causing dehydration and tachycardia as well as relative hypotension.  This does not make her lightheaded, but she did see a cardiologist who suggested that she decrease her Toprol dose and increase her hydration to 100 ounces of liquids per day.  This has been a difficult goal to achieve.    For her nasal symptoms, Inocencio is currently using Zyrtec and Singulair.  She has not resumed Flonase as of yet.      Review of Systems   Constitutional: Negative for fatigue and fever.   HENT: Positive for congestion, hearing loss, postnasal drip and tinnitus. Negative for nosebleeds, rhinorrhea, sinus pressure, trouble swallowing and voice change.    Eyes: Negative for visual disturbance.   Respiratory: Negative for cough, shortness of breath and wheezing.    Cardiovascular: Negative for chest pain and palpitations.   Gastrointestinal: Negative for constipation, diarrhea, nausea and vomiting.   Endocrine: Negative for polyphagia and polyuria.   Genitourinary: Negative for frequency.   Musculoskeletal: Negative for arthralgias, back pain, gait problem and myalgias.   Skin: Negative for rash.   Allergic/Immunologic: Negative for environmental allergies.   Neurological: Positive for headaches. Negative for dizziness and weakness.   Hematological: Negative for adenopathy.   Psychiatric/Behavioral: Negative for dysphoric mood and sleep disturbance. The patient is not nervous/anxious.        Current Medications: Breztri aerosphere, bupropion xl, jeremi, cetirizine, escitalopram, Flonase Sensimist, as needed, lorazepam, metoprolol tartrate, montelukast, ondansetron odt, polyethylene glycol, and triamterene-hydrochlorothiazid.    Physical Exam:  Vitals:   Visit Vitals  BP 90/70 (BP Location: Left upper arm, Patient Position: Sitting)   Pulse 86   Ht 1.676 m (5' 6\")   Wt " 61.2 kg (135 lb)   SpO2 100%   BMI 21.79 kg/m²     General:  Well-developed, well-nourished 31 y.o. who is in no acute distress.  Voice: Normal without hoarseness, breathiness or stridor.  Head/face:  No scars or lesions.  No parotid masses. No presinus tenderness. Seventh cranial nerves intact.  Eyes:  Extraocular movements and gaze alignment normal.  Ears: Auricles normal.  Dry cerumen removed from the left external auditory canal with curette.  Tympanic membranes clear, mobile and without retraction or scar.  Nose:  Dorsum straight.  Septum sinusoidal with a primary deflection to the right.  Turbinates purple and edematous but normal in size and orientation.  No pus, polyps or crusts.  Oral Cavity/oropharynx: Normal tongue thrust. Normal gag reflex. No masses, leukoplakia, erythroplakia, ulcers or other abnormalities at the tongue, floor of mouth, buccal mucosa or palate.  Cobblestoning at posterior pharyngeal wall.  Larynx/hypopharynx:  Normal supraglottis.  Vocal folds smooth and white.  Arytenoids sharp and mobile.  Interarytenoid and post-glottic mucosa normal.  No masses at the base of tongue, vallecula or piriform sinuses.  Neck:  No masses, adenopathy, cervical spasm or thyroid enlargement.  Cranial Nerves II through XII: Grossly intact.  Mental status: Awake, alert and oriented ×3.  No anxiety or depression.    Audiogram: Normal hearing across all frequencies on the left. Mild high-frequency sensorineural hearing loss above 4000 Hz on the right. Discrimination scores: 100% at 45 dB on the left, 96% of 50 dB on the right.    Tympanogram: Negative pressure on the right, normal on the left.      Impression:  1.  Atypical right Ménière's syndrome/vestibular migraine.  Dyazide seems to be causing untoward side effects.  An effort will be made to decrease the dose.    2.  Migraine variant with electric shock pains in the tongue and the head.  The patient should definitely seek out prophylactic medication for  "migraine.  She will contact her neurologist.  3.  High-frequency sensorineural hearing loss on the right, unchanged.  4.  Allergic rhinitis.    Recommendations and Plan:  1.  Decrease Dyazide to 2 days on and 1 day off for 1 month.  If this is well-tolerated, decrease to every other day dosing.  2.  Simply Saline, 2 squirts to each nostril twice daily.  3.  Zyrtec, 10 mg each evening.  4.  Montelukast, 10 mg each evening.  5.  If the episodes of headache and \"shocks\" continue, please contact the neurologist.  You may want to start a prophylactic medication.  6.  B complex vitamin, 1 daily.  7.  Magnesium, 400 mg daily.  8.  Continue to hydrate yourself well.  9.  Follow-up in 6 months.        Krissy Hunter MD              "

## 2021-12-02 RX ORDER — TRIAMTERENE AND HYDROCHLOROTHIAZIDE 37.5; 25 MG/1; MG/1
CAPSULE ORAL
Qty: 90 CAPSULE | Refills: 3 | Status: SHIPPED | OUTPATIENT
Start: 2021-12-02 | End: 2022-09-15 | Stop reason: SDUPTHER

## 2021-12-06 ENCOUNTER — OFFICE VISIT (OUTPATIENT)
Dept: OTOLARYNGOLOGY | Facility: CLINIC | Age: 31
End: 2021-12-06
Payer: COMMERCIAL

## 2021-12-06 VITALS
SYSTOLIC BLOOD PRESSURE: 100 MMHG | BODY MASS INDEX: 21.69 KG/M2 | TEMPERATURE: 97.7 F | HEIGHT: 66 IN | HEART RATE: 107 BPM | OXYGEN SATURATION: 98 % | DIASTOLIC BLOOD PRESSURE: 74 MMHG | WEIGHT: 135 LBS

## 2021-12-06 DIAGNOSIS — M62.89 HYPERTROPHY OF RIGHT MASSETER MUSCLE: Primary | ICD-10-CM

## 2021-12-06 DIAGNOSIS — M62.89 HYPERTROPHY OF LEFT MASSETER MUSCLE: ICD-10-CM

## 2021-12-06 DIAGNOSIS — R68.84 JAW PAIN: ICD-10-CM

## 2021-12-06 DIAGNOSIS — R51.9 NONINTRACTABLE HEADACHE, UNSPECIFIED CHRONICITY PATTERN, UNSPECIFIED HEADACHE TYPE: ICD-10-CM

## 2021-12-06 PROCEDURE — 99214 OFFICE O/P EST MOD 30 MIN: CPT | Performed by: OTOLARYNGOLOGY

## 2021-12-06 PROCEDURE — 3008F BODY MASS INDEX DOCD: CPT | Performed by: OTOLARYNGOLOGY

## 2021-12-06 NOTE — PROGRESS NOTES
[facility]  [address], [address]  [place] [address]  Phone: [phone]  Fax: [phone]      Patient ID: Mona Traylor                              : 1990    Visit Date: 2021  Referring Provider: Yadira Trujillo MD    Chief Complaint: No chief complaint on file.      Mona Traylor is a 31 y.o.  female who presents with concerns about TMJ and headaches.  She is interested in discussing the possibility of botox treatment. She sees Dr Hunter for meniere's.     She has had recurrent severe headaches for several years.  She had an incidental finding of a lesion on her brain that she follows with a neurosurgeon for which was determined to be benign.  She recently met with a neurologist for the first time and was diagnosed with migraines.  Her head aches can last from a few hours to days.  Uni lateral sound and light sensitivitiy, no aura.  Sometimes it seems to be triggered by tension in her neck or jaw.  SH has been treating with over the counter medications.  The neurologist Dr Post recommend sumatriptan, however the patient elected to continue with OTC meds as she is on numerous other medications for other medical conditions.      She states about 10-15 years ago she had an incident where she had a dystonic reaction to a medication that caused her jaw to become misaligned and she has never been able to get it properly back in place since.  She tends to clench and grind her teeth at night.  Her dentist fitted her with a .  She has not seen an oral surgeon about her jaw issues.  She has had her wisdom teeth removed.  She feels pain and tension in her masseter muscles frequently.  Occasionally her jaw pops.      Review of Systems    Current Medications: has a current medication list which includes the following prescription(s): breztri aerosphere, bupropion xl, jeremi, cetirizine, escitalopram, lorazepam, metoprolol tartrate, montelukast, ondansetron odt,  "polyethylene glycol, triamterene-hydrochlorothiazid, and fluticasone furoate.    Past Medical History:   Past Medical History:   Diagnosis Date   • Anxiety    • Asthma    • Depression    • Hypertension    • Tinnitus        Past Surgical History:   Past Surgical History:   Procedure Laterality Date   • CERVICAL BIOPSY  W/ LOOP ELECTRODE EXCISION     • MENISCECTOMY Left    • WISDOM TOOTH EXTRACTION         Social History:  reports that she has never smoked. She has never used smokeless tobacco. She reports current alcohol use. She reports that she does not use drugs.    Family History:   Family History   Problem Relation Age of Onset   • No Known Problems Biological Mother    • Heart failure Biological Father    • ADD / ADHD Biological Brother            Allergies: Gadoterate meglumine, Reglan [metoclopramide hcl], Penicillins, and Sulfa (sulfonamide antibiotics)    Physical Exam:  Vitals:   Visit Vitals  /74 (BP Location: Left upper arm, Patient Position: Sitting)   Pulse (!) 107   Temp 36.5 °C (97.7 °F) (Temporal)   Ht 1.676 m (5' 6\")   Wt 61.2 kg (135 lb)   SpO2 98%   BMI 21.79 kg/m²     General:  Well-developed, well-nourished 31 y.o. femalein no acute distress.  Voice: Normal without hoarseness, breathiness or stridor.  Head/face:  No scars or lesions.  No parotid masses. No presinus tenderness. Symmetric, normal facies. Prominent masseter muscles and tenderness and crepitus on palpation of TMJ, jaw deviates to L  Eyes:  Extraocular movements and gaze alignment normal.  Ears: Auricles normal.  External auditory canals free of cerumen.  Tympanic membranes clear, mobile and without retraction or scar.  Bilateral ears visualized with otomicroscope  Nose:  Dorsum straight.  Septum midline.  Turbinates normal in size and orientation.  No pus, polyps or crusts.  Oral Cavity/oropharynx:  Normal tongue thrust. Normal gag reflex. No masses, leukoplakia, erythroplakia, ulcers or other abnormalities at the tongue, " floor of mouth, buccal mucosa, palate or posterior pharyngeal wall.   Dentition:normal class I occlusion  Neck:  No masses, adenopathy, cervical spasm or thyroid enlargement.  Cranial Nerves II through XII: Grossly intact.  Lungs: equal chest rise  Heart: Regular rate and rhythm.  Mental status: Awake, alert and oriented ×3.        Impression:  31 y.o.  female with Hypertrophy of right masseter muscle  (primary encounter diagnosis)    Hypertrophy of left masseter muscle    Jaw pain    Nonintractable headache, unspecified chronicity pattern, unspecified headache type    I discussed with the patient that I can inject her masseters with botox - this would cause a temporary paresis for 3-4 months that may help with clenching, grinding and pain.  We would be unlikely to get it covered by insurance.  I also discussed she may seek an oral surgery consult, as they would be the expert in management of the TMJ-D and may be better able to advise her about the jaw alignment issues.     For the headache/migraines, I recommend following advise of Dr Li, try sumatriptan and if botox is indicated for migraines, the neurologist would be  The ideal person to treat this.     Problem List Items Addressed This Visit     None      Visit Diagnoses     Hypertrophy of right masseter muscle    -  Primary    Hypertrophy of left masseter muscle        Jaw pain        Nonintractable headache, unspecified chronicity pattern, unspecified headache type              Kinjal España MD

## 2021-12-06 NOTE — LETTER
2021     Yadira Trujillo MD  [address] [facility]  [address]  [place] [address]    Patient: Mona Traylor  YOB: 1990  Date of Visit: 2021      Dear Dr. Trujillo:    Thank you for referring Mona Traylor to me for evaluation. Below are my notes for this consultation.    If you have questions, please do not hesitate to call me. I look forward to following your patient along with you.         Sincerely,        Kinjal España MD        CC: No Recipients  Kinjal España MD  2021  4:54 PM  Signed      [facility]  [address] [facility], [address]  [place] [address]  Phone: [phone]  Fax: [phone]      Patient ID: Mona Traylor                              : 1990    Visit Date: 2021  Referring Provider: Yadira Trujillo MD    Chief Complaint: No chief complaint on file.      Mona Traylor is a 31 y.o.  female who presents with concerns about TMJ and headaches.  She is interested in discussing the possibility of botox treatment. She sees Dr Hunter for meniere's.     She has had recurrent severe headaches for several years.  She had an incidental finding of a lesion on her brain that she follows with a neurosurgeon for which was determined to be benign.  She recently met with a neurologist for the first time and was diagnosed with migraines.  Her head aches can last from a few hours to days.  Uni lateral sound and light sensitivitiy, no aura.  Sometimes it seems to be triggered by tension in her neck or jaw.  ALISSA has been treating with over the counter medications.  The neurologist Dr Post recommend sumatriptan, however the patient elected to continue with OTC meds as she is on numerous other medications for other medical conditions.      She states about 10-15 years ago she had an incident where she had a dystonic reaction to a medication that caused her jaw to become misaligned and she has never been able to get it properly back in place since.  She  "tends to clench and grind her teeth at night.  Her dentist fitted her with a .  She has not seen an oral surgeon about her jaw issues.  She has had her wisdom teeth removed.  She feels pain and tension in her masseter muscles frequently.  Occasionally her jaw pops.      Review of Systems    Current Medications: has a current medication list which includes the following prescription(s): breztri aerosphere, bupropion xl, jeremi, cetirizine, escitalopram, lorazepam, metoprolol tartrate, montelukast, ondansetron odt, polyethylene glycol, triamterene-hydrochlorothiazid, and fluticasone furoate.    Past Medical History:   Past Medical History:   Diagnosis Date   • Anxiety    • Asthma    • Depression    • Hypertension    • Tinnitus        Past Surgical History:   Past Surgical History:   Procedure Laterality Date   • CERVICAL BIOPSY  W/ LOOP ELECTRODE EXCISION     • MENISCECTOMY Left    • WISDOM TOOTH EXTRACTION         Social History:  reports that she has never smoked. She has never used smokeless tobacco. She reports current alcohol use. She reports that she does not use drugs.    Family History:   Family History   Problem Relation Age of Onset   • No Known Problems Biological Mother    • Heart failure Biological Father    • ADD / ADHD Biological Brother            Allergies: Gadoterate meglumine, Reglan [metoclopramide hcl], Penicillins, and Sulfa (sulfonamide antibiotics)    Physical Exam:  Vitals:   Visit Vitals  /74 (BP Location: Left upper arm, Patient Position: Sitting)   Pulse (!) 107   Temp 36.5 °C (97.7 °F) (Temporal)   Ht 1.676 m (5' 6\")   Wt 61.2 kg (135 lb)   SpO2 98%   BMI 21.79 kg/m²     General:  Well-developed, well-nourished 31 y.o. femalein no acute distress.  Voice: Normal without hoarseness, breathiness or stridor.  Head/face:  No scars or lesions.  No parotid masses. No presinus tenderness. Symmetric, normal facies. Prominent masseter muscles and tenderness and crepitus on palpation " of TMJ, jaw deviates to L  Eyes:  Extraocular movements and gaze alignment normal.  Ears: Auricles normal.  External auditory canals free of cerumen.  Tympanic membranes clear, mobile and without retraction or scar.  Bilateral ears visualized with otomicroscope  Nose:  Dorsum straight.  Septum midline.  Turbinates normal in size and orientation.  No pus, polyps or crusts.  Oral Cavity/oropharynx:  Normal tongue thrust. Normal gag reflex. No masses, leukoplakia, erythroplakia, ulcers or other abnormalities at the tongue, floor of mouth, buccal mucosa, palate or posterior pharyngeal wall.   Dentition:normal class I occlusion  Neck:  No masses, adenopathy, cervical spasm or thyroid enlargement.  Cranial Nerves II through XII: Grossly intact.  Lungs: equal chest rise  Heart: Regular rate and rhythm.  Mental status: Awake, alert and oriented ×3.        Impression:  31 y.o.  female with Hypertrophy of right masseter muscle  (primary encounter diagnosis)    Hypertrophy of left masseter muscle    Jaw pain    Nonintractable headache, unspecified chronicity pattern, unspecified headache type    I discussed with the patient that I can inject her masseters with botox - this would cause a temporary paresis for 3-4 months that may help with clenching, grinding and pain.  We would be unlikely to get it covered by insurance.  I also discussed she may seek an oral surgery consult, as they would be the expert in management of the TMJ-D and may be better able to advise her about the jaw alignment issues.     For the headache/migraines, I recommend following advise of Dr Li, try sumatriptan and if botox is indicated for migraines, the neurologist would be  The ideal person to treat this.     Problem List Items Addressed This Visit     None      Visit Diagnoses     Hypertrophy of right masseter muscle    -  Primary    Hypertrophy of left masseter muscle        Jaw pain        Nonintractable headache, unspecified chronicity  pattern, unspecified headache type              Kinjal España MD

## 2021-12-09 ENCOUNTER — IMMUNIZATION (OUTPATIENT)
Dept: IMMUNIZATION | Facility: CLINIC | Age: 31
End: 2021-12-09
Payer: COMMERCIAL

## 2021-12-09 PROCEDURE — 0064A SARS-COV-2 VACCINE BOOSTER MODERNA: CPT | Performed by: FAMILY MEDICINE

## 2021-12-09 PROCEDURE — 91306 SARS-COV-2 VACCINE BOOSTER MODERNA: CPT | Performed by: FAMILY MEDICINE

## 2022-01-19 RX ORDER — BUDESONIDE, GLYCOPYRROLATE, AND FORMOTEROL FUMARATE 160; 9; 4.8 UG/1; UG/1; UG/1
160 AEROSOL, METERED RESPIRATORY (INHALATION) 2 TIMES DAILY
Qty: 160 G | Refills: 11 | Status: SHIPPED | OUTPATIENT
Start: 2022-01-19

## 2022-03-22 ENCOUNTER — TELEPHONE (OUTPATIENT)
Dept: OTOLARYNGOLOGY | Facility: CLINIC | Age: 32
End: 2022-03-22
Payer: COMMERCIAL

## 2022-03-22 NOTE — TELEPHONE ENCOUNTER
Pt emailed me the following message.  Please advise....I did have two other questions. In regards to the treatment areas, will Dr. España only be injecting my masseter muscles or will she inject my temporalis muscles as well? Secondly, if Dr. España will be going on maternity leave the week after my injections, if I had any issues or concerns, how would those be addressed?

## 2022-04-08 ENCOUNTER — OFFICE VISIT (OUTPATIENT)
Dept: OTOLARYNGOLOGY | Facility: CLINIC | Age: 32
End: 2022-04-08

## 2022-04-08 VITALS
SYSTOLIC BLOOD PRESSURE: 102 MMHG | TEMPERATURE: 97.9 F | DIASTOLIC BLOOD PRESSURE: 68 MMHG | BODY MASS INDEX: 21.69 KG/M2 | HEIGHT: 66 IN | HEART RATE: 87 BPM | OXYGEN SATURATION: 99 % | WEIGHT: 135 LBS

## 2022-04-08 DIAGNOSIS — M62.89 HYPERTROPHY OF RIGHT MASSETER MUSCLE: ICD-10-CM

## 2022-04-08 DIAGNOSIS — M62.89 HYPERTROPHY OF LEFT MASSETER MUSCLE: ICD-10-CM

## 2022-04-08 DIAGNOSIS — R68.84 JAW PAIN: Primary | ICD-10-CM

## 2022-04-08 PROCEDURE — 99999 PR OFFICE/OUTPT VISIT,PROCEDURE ONLY: CPT | Performed by: OTOLARYNGOLOGY

## 2022-04-08 PROCEDURE — ZZBOTOX ZZBOTOX: Performed by: OTOLARYNGOLOGY

## 2022-04-08 RX ORDER — METOPROLOL SUCCINATE 25 MG/1
25 TABLET, EXTENDED RELEASE ORAL DAILY
COMMUNITY

## 2022-04-08 RX ORDER — SUMATRIPTAN SUCCINATE 50 MG/1
50 TABLET ORAL AS NEEDED
COMMUNITY
Start: 2021-12-08

## 2022-04-08 RX ORDER — CLINDAMYCIN PHOSPHATE 10 MG/G
GEL TOPICAL DAILY PRN
COMMUNITY
Start: 2022-02-22

## 2022-04-08 NOTE — LETTER
April 8, 2022     Yadira Trujillo MD  [address] [place] [address]    Patient: Mona Traylor  YOB: 1990  Date of Visit: 4/8/2022      Dear Dr. Trujillo:    Thank you for referring Mona Traylor to me for evaluation. Below are my notes for this consultation.    If you have questions, please do not hesitate to call me. I look forward to following your patient along with you.         Sincerely,        Kinjal España MD        CC: No Recipients  Kinjal España MD  4/8/2022  1:17 PM  Signed  Procedures  Patient presents for botulinum toxin injection into bilateral masseter muscles for her clenching grinding masseter hypertrophy and TMJ dysfunction and pain.    She signed written consent.  The lateral face near the angle of mandible over the masseter muscle was cleaned with alcohol.  25 units in 5 unit increments was injected into the masseter muscle on each side.  Patient tolerated this well.    Follow-up in 3 to 4 months for repeat injection if patient is happy with the result.

## 2022-04-08 NOTE — PROCEDURES
Procedures  Patient presents for botulinum toxin injection into bilateral masseter muscles for her clenching grinding masseter hypertrophy and TMJ dysfunction and pain.    She signed written consent.  The lateral face near the angle of mandible over the masseter muscle was cleaned with alcohol.  25 units in 5 unit increments was injected into the masseter muscle on each side.  Patient tolerated this well.    Follow-up in 3 to 4 months for repeat injection if patient is happy with the result.

## 2022-04-11 ENCOUNTER — PROCEDURE VISIT (OUTPATIENT)
Dept: OTOLARYNGOLOGY | Facility: CLINIC | Age: 32
End: 2022-04-11
Payer: COMMERCIAL

## 2022-04-11 ENCOUNTER — OFFICE VISIT (OUTPATIENT)
Dept: OTOLARYNGOLOGY | Facility: CLINIC | Age: 32
End: 2022-04-11
Payer: COMMERCIAL

## 2022-04-11 VITALS
WEIGHT: 135 LBS | SYSTOLIC BLOOD PRESSURE: 100 MMHG | HEART RATE: 83 BPM | HEIGHT: 66 IN | OXYGEN SATURATION: 98 % | DIASTOLIC BLOOD PRESSURE: 70 MMHG | BODY MASS INDEX: 21.69 KG/M2

## 2022-04-11 DIAGNOSIS — H81.01 ACTIVE COCHLEAR MENIERE'S DISEASE OF RIGHT EAR: Primary | ICD-10-CM

## 2022-04-11 DIAGNOSIS — F45.8 BRUXISM: ICD-10-CM

## 2022-04-11 DIAGNOSIS — J30.1 SEASONAL ALLERGIC RHINITIS DUE TO POLLEN: ICD-10-CM

## 2022-04-11 DIAGNOSIS — G43.809 VESTIBULAR MIGRAINE: ICD-10-CM

## 2022-04-11 DIAGNOSIS — H90.41 SENSORINEURAL HEARING LOSS (SNHL) OF RIGHT EAR WITH UNRESTRICTED HEARING OF LEFT EAR: ICD-10-CM

## 2022-04-11 DIAGNOSIS — G43.709 CHRONIC MIGRAINE WITHOUT AURA WITHOUT STATUS MIGRAINOSUS, NOT INTRACTABLE: ICD-10-CM

## 2022-04-11 PROBLEM — G43.909 MIGRAINE: Status: ACTIVE | Noted: 2021-12-15

## 2022-04-11 PROCEDURE — 99999 PR OFFICE/OUTPT VISIT,PROCEDURE ONLY: CPT | Performed by: AUDIOLOGIST

## 2022-04-11 PROCEDURE — 3008F BODY MASS INDEX DOCD: CPT | Performed by: OTOLARYNGOLOGY

## 2022-04-11 PROCEDURE — 99214 OFFICE O/P EST MOD 30 MIN: CPT | Mod: 25 | Performed by: OTOLARYNGOLOGY

## 2022-04-11 ASSESSMENT — ENCOUNTER SYMPTOMS
VOICE CHANGE: 0
WEAKNESS: 0
HEADACHES: 1
BACK PAIN: 0
PALPITATIONS: 0
NAUSEA: 0
SLEEP DISTURBANCE: 0
DYSPHORIC MOOD: 0
TROUBLE SWALLOWING: 0
SHORTNESS OF BREATH: 0
FATIGUE: 0
DIARRHEA: 0
VOMITING: 0
CONSTIPATION: 0
ADENOPATHY: 0
WHEEZING: 0
COUGH: 0
ARTHRALGIAS: 0
RHINORRHEA: 0
FEVER: 0
MYALGIAS: 0
SINUS PRESSURE: 0
POLYPHAGIA: 0
DIZZINESS: 1
FREQUENCY: 0
NERVOUS/ANXIOUS: 0

## 2022-04-11 NOTE — PROGRESS NOTES
[facility]  [address] [facility], [address]  [place] [address]  Phone: [phone]  Fax: [phone]      Patient ID: Mona Traylor                              : 1990    Visit Date: 2022  Referring Provider: Yadira Trujillo MD    Chief Complaint: Meniere's Disease      Mona Traylor returned to the [place] office today in regard to her history of atypical Ménière syndrome/vestibular migraine exacerbated by TMJ arthralgia and clenching.    I last saw Mona I had recommended a diminution in the use of Dyazide 2 days on and 1 day off.  In addition, she was using saline, Zyrtec and montelukast for allergy.  For migraine, B complex vitamins and magnesium as well as medication for headache was recommended.  She has used sumatriptan hand twice since I saw her last with excellent results.  She does not have any headache pain now.    Mona has a history of bruxism which is intense.  She uses a nightguard but is always afraid that she will break it because of the extent of the clenching.  She underwent a Botox injection just 3 days ago performed by my associate, Dr. Kinjal España.  She has already noticed a loosening of the muscular tightness when she awakens in the morning.  This is nothing dramatic but it seems to have helped a bit.    In other adjustments to her medications, Mona has also decreased the Toprol to 12.5 mg/day and her cardiologist would like to eliminate it altogether.  The process of tapering down is going to be very slow so that she does not manifest palpitations.  Also, she would like to decrease the Dyazide to every other day because she has not been vertiginous in several months.  She sometimes experiences mild dizziness.    Review of Systems   Constitutional: Negative for fatigue and fever.   HENT: Positive for hearing loss and tinnitus. Negative for congestion, nosebleeds, postnasal drip, rhinorrhea, sinus pressure, trouble swallowing and voice change.          "Clenching.  Intermittent right ear fullness.   Eyes: Negative for visual disturbance.   Respiratory: Negative for cough, shortness of breath and wheezing.    Cardiovascular: Negative for chest pain and palpitations.   Gastrointestinal: Negative for constipation, diarrhea, nausea and vomiting.   Endocrine: Negative for polyphagia and polyuria.   Genitourinary: Negative for frequency.   Musculoskeletal: Negative for arthralgias, back pain, gait problem and myalgias.   Skin: Negative for rash.   Allergic/Immunologic: Negative for environmental allergies.   Neurological: Positive for dizziness and headaches. Negative for weakness.   Hematological: Negative for adenopathy.   Psychiatric/Behavioral: Negative for dysphoric mood and sleep disturbance. The patient is not nervous/anxious.        Current Medications: Breztri aerosphere as needed, bupropion xl, jeremi, cetirizine, clindamycin, escitalopram, lorazepam, metoprolol succinate xl, montelukast, ondansetron odt, polyethylene glycol, sumatriptan, and triamterene-hydrochlorothiazide.    Physical Exam:  Vitals:   Visit Vitals  /70 (BP Location: Left upper arm, Patient Position: Sitting)   Pulse 83   Ht 1.676 m (5' 6\")   Wt 61.2 kg (135 lb)   SpO2 98%   BMI 21.79 kg/m²     General:  Well-developed, well-nourished 32 y.o. who is in no acute distress.  Voice: Normal without hoarseness, breathiness or stridor.  Head/face:  No scars or lesions.  No parotid masses. No presinus tenderness. Seventh cranial nerves intact.  Eyes:  Extraocular movements and gaze alignment normal.  Ears: Auricles normal.  Minimal cerumen on the left only, removed with curette under binocular magnification.  Tympanic membranes clear, mobile and without retraction or scar.  Nose:  Dorsum straight.  Septum sinusoidal.  Turbinates edematous but normal in size and orientation.  No pus, polyps or crusts.  Oral Cavity/oropharynx: Normal tongue thrust. Normal gag reflex. No masses, leukoplakia, " erythroplakia, ulcers or other abnormalities at the tongue, floor of mouth, buccal mucosa, palate or posterior pharyngeal wall.  Larynx/hypopharynx:  Normal supraglottis.  Vocal folds smooth and white.  Arytenoids sharp and mobile.  Interarytenoid and post-glottic mucosa normal.  No masses at the base of tongue, vallecula or piriform sinuses.  Neck:  No masses, adenopathy, cervical spasm or thyroid enlargement.  Cranial Nerves II through XII: Grossly intact.  Mental status: Awake, alert and oriented ×3.  No anxiety or depression.    Audiogram: Normal hearing across all frequencies on the left.  Very mild sensorineural hearing loss above 4000 Hz on the right.  Discrimination scores: 100% at 45 dB on the left, 100% of 50 dB on the right.    Tympanogram: Normal bilaterally.      Impression:  1.  Vestibular migraine/atypical Ménière's disease with episodes of dizziness, right aural fullness, tinnitus and intermittent headaches.  The patient's symptoms are definitely diminished and are under good control right now.  Triggers include the entities listed below.  2.  Allergic rhinitis.  The patient can add Flonase to her current regimen as needed.    3.  Bruxism.  Status post first Botox injection.  The patient's progress will be monitored.    4.  Eustachian tube dysfunction due to allergies and bruxism.    Recommendations and Plan:  1.  Decrease Dyazide to every other day dosing.  2.  Simply Saline, 2 squirts to each nostril twice daily.  3.  Flonase Sensimist, 1 squirt to each nostril after the saline in the morning.  4.  Zyrtec, 10 mg daily with plenty of water.  5.  Singulair, 10 mg at bedtime.  6.  Drink plenty of water, at least 60 ounces per day.  7.  B complex vitamins and magnesium to continue daily.  8.  For next Botox injection, see Dr. Sabrina Tao or Dr. Mendez Johnson while Dr. España is out on maternity leave.  9.  Follow-up in 6 months.        Krissy Hunter MD

## 2022-04-11 NOTE — PATIENT INSTRUCTIONS
1.  Decrease Dyazide to every other day dosing.  2.  Simply Saline, 2 squirts to each nostril twice daily.  3.  Flonase Sensimist, 1 squirt to each nostril after the saline in the morning.  4.  Zyrtec, 10 mg daily with plenty of water.  5.  Singulair, 10 mg at bedtime.  6.  Drink plenty of water, at least 60 ounces per day.  7.  B complex vitamins and magnesium to continue daily.  8.  For next Botox injection, see Dr. Sabrina Tao or Dr. Mendez Johnson.  9.  Follow-up in 6 months.

## 2023-01-13 LAB — PAP SMEAR, EXTERNAL: NEGATIVE

## 2023-02-15 ENCOUNTER — OFFICE VISIT (OUTPATIENT)
Dept: FAMILY MEDICINE CLINIC | Age: 33
End: 2023-02-15
Payer: MEDICAID

## 2023-02-15 VITALS
RESPIRATION RATE: 18 BRPM | HEIGHT: 66 IN | HEART RATE: 91 BPM | DIASTOLIC BLOOD PRESSURE: 68 MMHG | BODY MASS INDEX: 18.84 KG/M2 | SYSTOLIC BLOOD PRESSURE: 108 MMHG | WEIGHT: 117.2 LBS | OXYGEN SATURATION: 99 % | TEMPERATURE: 97.5 F

## 2023-02-15 DIAGNOSIS — J45.20 MILD INTERMITTENT ASTHMA WITHOUT COMPLICATION: ICD-10-CM

## 2023-02-15 DIAGNOSIS — G93.9 BRAIN LESION: ICD-10-CM

## 2023-02-15 DIAGNOSIS — F41.8 DEPRESSION WITH ANXIETY: Primary | ICD-10-CM

## 2023-02-15 DIAGNOSIS — I10 ESSENTIAL HYPERTENSION: ICD-10-CM

## 2023-02-15 DIAGNOSIS — K21.9 GASTROESOPHAGEAL REFLUX DISEASE WITHOUT ESOPHAGITIS: ICD-10-CM

## 2023-02-15 DIAGNOSIS — H81.13 BENIGN PAROXYSMAL POSITIONAL VERTIGO DUE TO BILATERAL VESTIBULAR DISORDER: ICD-10-CM

## 2023-02-15 PROCEDURE — 99203 OFFICE O/P NEW LOW 30 MIN: CPT | Performed by: FAMILY MEDICINE

## 2023-02-15 PROCEDURE — 3078F DIAST BP <80 MM HG: CPT | Performed by: FAMILY MEDICINE

## 2023-02-15 PROCEDURE — 3074F SYST BP LT 130 MM HG: CPT | Performed by: FAMILY MEDICINE

## 2023-02-15 RX ORDER — BUPROPION HYDROCHLORIDE 300 MG/1
300 TABLET ORAL DAILY
COMMUNITY

## 2023-02-15 RX ORDER — METOPROLOL SUCCINATE 25 MG/1
12.5 TABLET, EXTENDED RELEASE ORAL DAILY
Qty: 45 TABLET | Refills: 3 | Status: SHIPPED | OUTPATIENT
Start: 2023-02-15

## 2023-02-15 RX ORDER — ESCITALOPRAM OXALATE 20 MG/1
20 TABLET ORAL DAILY
Qty: 90 TABLET | Refills: 3 | Status: SHIPPED | OUTPATIENT
Start: 2023-02-15

## 2023-02-15 RX ORDER — BUDESONIDE, GLYCOPYRROLATE, AND FORMOTEROL FUMARATE 160; 9; 4.8 UG/1; UG/1; UG/1
2 AEROSOL, METERED RESPIRATORY (INHALATION)
Qty: 10.7 G | Refills: 11 | Status: SHIPPED | OUTPATIENT
Start: 2023-02-15

## 2023-02-15 RX ORDER — ESCITALOPRAM OXALATE 20 MG/1
20 TABLET ORAL DAILY
COMMUNITY
End: 2023-02-15 | Stop reason: SDUPTHER

## 2023-02-15 RX ORDER — ALBUTEROL SULFATE 90 UG/1
2 AEROSOL, METERED RESPIRATORY (INHALATION)
COMMUNITY

## 2023-02-15 RX ORDER — PREDNISONE 20 MG/1
20 TABLET ORAL
Qty: 10 TABLET | Refills: 0 | Status: SHIPPED | OUTPATIENT
Start: 2023-02-15

## 2023-02-15 RX ORDER — ACETAMINOPHEN AND CODEINE PHOSPHATE 120; 12 MG/5ML; MG/5ML
0.35 SOLUTION ORAL DAILY
COMMUNITY

## 2023-02-15 RX ORDER — SUMATRIPTAN 50 MG/1
50 TABLET, FILM COATED ORAL
COMMUNITY

## 2023-02-15 RX ORDER — METOPROLOL SUCCINATE 25 MG/1
12.5 TABLET, EXTENDED RELEASE ORAL DAILY
COMMUNITY
End: 2023-02-15 | Stop reason: SDUPTHER

## 2023-02-15 RX ORDER — MECLIZINE HYDROCHLORIDE 25 MG/1
25 TABLET ORAL
COMMUNITY

## 2023-02-15 RX ORDER — BUDESONIDE, GLYCOPYRROLATE, AND FORMOTEROL FUMARATE 160; 9; 4.8 UG/1; UG/1; UG/1
2 AEROSOL, METERED RESPIRATORY (INHALATION) AS NEEDED
COMMUNITY
End: 2023-02-15 | Stop reason: SDUPTHER

## 2023-02-15 RX ORDER — TRIAMTERENE/HYDROCHLOROTHIAZID 37.5-25 MG
1 TABLET ORAL DAILY
Qty: 90 TABLET | Refills: 3 | Status: SHIPPED | OUTPATIENT
Start: 2023-02-15

## 2023-02-15 RX ORDER — ONDANSETRON 4 MG/1
4 TABLET, FILM COATED ORAL
COMMUNITY

## 2023-02-15 NOTE — PROGRESS NOTES
Sonia Vyas is a 33 y.o. female  Chief Complaint   Patient presents with    New Patient     Moved to  from [facility]     HPI:    Hypertension. Blood pressures in goal. Management at last visit included continuing current regimen . Fred Santiago Current regimen: maxzide, toprol XL. Symptoms include no symptoms. Patient denies chest pain, palpitations. .  Lab review:   Lab Results   Component Value Date/Time    Sodium 138 05/31/2016 08:14 AM    Potassium 4.1 05/31/2016 08:14 AM    Chloride 106 05/31/2016 08:14 AM    CO2 26 05/31/2016 08:14 AM    Anion gap 6 05/31/2016 08:14 AM    Glucose 83 05/31/2016 08:14 AM    BUN 12 05/31/2016 08:14 AM    Creatinine 0.75 05/31/2016 08:14 AM    BUN/Creatinine ratio 16 05/31/2016 08:14 AM    GFR est AA >60 05/31/2016 08:14 AM    GFR est non-AA >60 05/31/2016 08:14 AM    Calcium 9.2 05/31/2016 08:14 AM     Lab Results   Component Value Date/Time    Cholesterol, total 139 05/31/2016 08:14 AM    HDL Cholesterol 69 (H) 05/31/2016 08:14 AM    LDL, calculated 59.4 05/31/2016 08:14 AM    VLDL, calculated 10.6 05/31/2016 08:14 AM    Triglyceride 53 05/31/2016 08:14 AM    CHOL/HDL Ratio 2.0 05/31/2016 08:14 AM     Depression  On lexapro, ativan, wellbutrin XL. Working pretty good for now, but is still adjusting to moving back to the [facility] from [facility]. 3 most recent PHQ Screens 2/15/2023   Little interest or pleasure in doing things Several days   Feeling down, depressed, irritable, or hopeless Several days   Total Score PHQ 2 2     R brain lesion  Seen on MRI, stable since 2017. Followed by Neurosurgery Dr. Eliel Moran. Starts in the centrum semiovale and has an irregular patchy appearance. Extends for about 1 cm anterior to posterior and extends 3 to 4 cm laterally into the precentral gyrus in the lateral frontal lobe. Asthma  Current control: Good   Current level: mild intermittent  Current symptoms: none  Current controller: On Breztri, yasmani well.   Current side effects: none  Last flareup: Fall 2022. Number of flareups in past year:3  Current symptom relief med: albuterol    PMH, SH, Medications/Allergies: reviewed, on chart. Works at  with [employer]. Current Outpatient Medications   Medication Sig    escitalopram oxalate (Lexapro) 20 mg tablet Take 20 mg by mouth daily. buPROPion XL (WELLBUTRIN XL) 300 mg XL tablet Take 300 mg by mouth daily. metoprolol succinate (Toprol XL) 25 mg XL tablet Take 12.5 mg by mouth daily. Takes 12.5 mg daily    DIAZOXIDE PO Take 37.5 mg by mouth every other day. albuterol (PROVENTIL HFA, VENTOLIN HFA, PROAIR HFA) 90 mcg/actuation inhaler Take 2 Puffs by inhalation every four (4) hours as needed for Wheezing, Shortness of Breath or Cough. budesonide-glycopyr-formoterol (Breztri Aerosphere) 160-9-4.8 mcg/actuation HFAA Take 2 Puffs by inhalation as needed. 1 to 2 times daily as needed for wheeze, SOB    ondansetron hcl (Zofran) 4 mg tablet Take 4 mg by mouth every eight (8) hours as needed for Nausea or Vomiting.    meclizine (ANTIVERT) 25 mg tablet Take 25 mg by mouth three (3) times daily as needed for Dizziness. SUMAtriptan (IMITREX) 50 mg tablet Take 50 mg by mouth once as needed for Migraine. norethindrone (MICRONOR) 0.35 mg tab Take 0.35 mg by mouth daily. polyethylene glycol (MIRALAX) 17 gram packet Take 1 Packet by mouth daily. LORazepam (ATIVAN) 1 mg tablet Take 1 Tab by mouth every four (4) hours as needed for Anxiety. Max Daily Amount: 6 mg. Cetirizine 10 mg cap Take  by mouth. No current facility-administered medications for this visit.       ROS:  Constitutional: No fever, chills or abnormal weight loss  Respiratory: No cough, SOB   CV: No chest pain or Palpitations    Visit Vitals  /68   Pulse 91   Temp 97.5 °F (36.4 °C) (Temporal)   Resp 18   Ht 5' 6\" (1.676 m)   Wt 117 lb 3.2 oz (53.2 kg)   SpO2 99%   BMI 18.92 kg/m²     Wt Readings from Last 3 Encounters:   02/15/23 117 lb 3.2 oz (53.2 kg)   06/06/16 131 lb (59.4 kg)   12/16/15 126 lb (57.2 kg)     BP Readings from Last 3 Encounters:   02/15/23 108/68   06/06/16 113/69   12/16/15 112/88     Physical Examination: General appearance - alert, well appearing, and in no distress  Mental status - alert, oriented to person, place, and time  Eyes - pupils equal and reactive, extraocular eye movements intact  ENT - bilateral external ears and nose normal. Normal lips  Neck - supple, no significant adenopathy, no thyromegaly or mass  Chest - clear to auscultation, no wheezes, rales or rhonchi, symmetric air entry  Heart - normal rate, regular rhythm, normal S1, S2, no murmurs, rubs, clicks or gallops  Extremities - peripheral pulses normal, no pedal edema, no clubbing or cyanosis    A/p:  Asthma  well controlled. con't current tx. RF ore. Discussed asthma action plan. Anxiety and depression  Reasonably well controlled. If needs more control, consider change to wellbutrin  BID    HTN  well controlled. con't current tx.  Check labs, adj PRN    Follow up 6mo/ PRN

## 2023-02-15 NOTE — PROGRESS NOTES
Alfredo Longoria is a 33 y.o. female presenting for/with:    Chief Complaint   Patient presents with    New Patient       Visit Vitals  /68   Pulse 91   Temp 97.5 °F (36.4 °C) (Temporal)   Resp 18   Ht 5' 6\" (1.676 m)   Wt 117 lb 3.2 oz (53.2 kg)   SpO2 99%   BMI 18.92 kg/m²     Pain Scale: /10  Pain Location:     1. \"Have you been to the ER, urgent care clinic since your last visit? Hospitalized since your last visit? \" No    2. \"Have you seen or consulted any other health care providers outside of the [address] [place] [address] since your last visit? \" Yes Where: Dr. Nani Severino Neurosurgery, Dr. Hien Jackson      3. For patients aged 39-70: Has the patient had a colonoscopy / FIT/ Cologuard? NA - based on age      If the patient is female:    4. For patients aged 41-77: Has the patient had a mammogram within the past 2 years? NA - based on age or sex      11. For patients aged 21-65: Has the patient had a pap smear? Yes - no Care Gap present      Symptom review:  Learning Assessment 2/15/2023   PRIMARY LEARNER Patient   PRIMARY LANGUAGE ENGLISH   LEARNER PREFERENCE PRIMARY DEMONSTRATION     -     -   ANSWERED BY patient   RELATIONSHIP SELF     Fall Risk Assessment, last 12 mths 2/15/2023   Able to walk? Yes   Fall in past 12 months? 0   Do you feel unsteady? 0   Are you worried about falling 0       3 most recent PHQ Screens 2/15/2023   Little interest or pleasure in doing things Several days   Feeling down, depressed, irritable, or hopeless Several days   Total Score PHQ 2 2     Abuse Screening Questionnaire 2/15/2023   Do you ever feel afraid of your partner? N   Are you in a relationship with someone who physically or mentally threatens you? N   Is it safe for you to go home?  Y       ADL Assessment 2/15/2023   Feeding yourself No Help Needed   Getting from bed to chair No Help Needed   Getting dressed No Help Needed   Bathing or showering No Help Needed   Walk across the room (includes cane/walker) No Help Needed   Using the telphone No Help Needed   Taking your medications No Help Needed   Preparing meals No Help Needed   Managing money (expenses/bills) No Help Needed   Moderately strenuous housework (laundry) No Help Needed   Shopping for personal items (toiletries/medicines) No Help Needed   Shopping for groceries No Help Needed   Driving No Help Needed   Climbing a flight of stairs No Help Needed   Getting to places beyond walking distances No Help Needed      Advance Care Planning 2/15/2023   Patient's Healthcare Decision Maker is: Legal Next of Kin   Confirm Advance Directive None   Patient Would Like to Complete Advance Directive No

## 2023-02-16 ENCOUNTER — APPOINTMENT (OUTPATIENT)
Dept: FAMILY MEDICINE CLINIC | Age: 33
End: 2023-02-16

## 2023-02-17 LAB
BUN SERPL-MCNC: 16 MG/DL (ref 6–20)
BUN/CREAT SERPL: 21 (ref 9–23)
CALCIUM SERPL-MCNC: 9.6 MG/DL (ref 8.7–10.2)
CHLORIDE SERPL-SCNC: 103 MMOL/L (ref 96–106)
CHOLEST SERPL-MCNC: 128 MG/DL (ref 100–199)
CO2 SERPL-SCNC: 26 MMOL/L (ref 20–29)
CREAT SERPL-MCNC: 0.77 MG/DL (ref 0.57–1)
EGFRCR SERPLBLD CKD-EPI 2021: 106 ML/MIN/1.73
GLUCOSE SERPL-MCNC: 89 MG/DL (ref 70–99)
HDLC SERPL-MCNC: 47 MG/DL
LDLC SERPL CALC-MCNC: 72 MG/DL (ref 0–99)
POTASSIUM SERPL-SCNC: 4.6 MMOL/L (ref 3.5–5.2)
SODIUM SERPL-SCNC: 141 MMOL/L (ref 134–144)
TRIGL SERPL-MCNC: 33 MG/DL (ref 0–149)
VLDLC SERPL CALC-MCNC: 9 MG/DL (ref 5–40)

## 2023-02-20 ENCOUNTER — TELEPHONE (OUTPATIENT)
Dept: FAMILY MEDICINE CLINIC | Age: 33
End: 2023-02-20

## 2023-02-20 NOTE — TELEPHONE ENCOUNTER
----- Message from Miky Ibrahim sent at 2/20/2023  3:06 PM EST -----  Subject: Medication Problem    Medication: triamterene-hydroCHLOROthiazide (MAXZIDE) 37.5-25 mg per   tablet  Dosage: 37.5-25  Ordering Provider: kevin    Question/Problem: Per So Del Toro with [facility], in pt's profile it   states pt is sensitive to thiazide and that is in that rx. Do you still   want pharmacy to fill? Please call [phone] and the reference # is   [id number]. Pharmacy: [facility]    ---------------------------------------------------------------------------  --------------  [address]  [phone]; OK to leave message on voicemail  ---------------------------------------------------------------------------  --------------    SCRIPT ANSWERS  Relationship to Patient: Third Party  Third Party Type: Pharmacy?   Representative Name: So Del Toro

## 2023-02-21 ENCOUNTER — TELEPHONE (OUTPATIENT)
Dept: FAMILY MEDICINE CLINIC | Age: 33
End: 2023-02-21

## 2023-02-21 NOTE — TELEPHONE ENCOUNTER
Spoke to patient and Saqib Garcia at pharmacy. Concern for allergies has been resolved, refill request is being processed.

## 2023-02-21 NOTE — TELEPHONE ENCOUNTER
Please call Joey Alves from [employer] order  Has a question about pt allergies    Reference # [id number]    [phone]

## 2023-05-14 ENCOUNTER — PATIENT MESSAGE (OUTPATIENT)
Age: 33
End: 2023-05-14

## 2023-05-14 DIAGNOSIS — F41.8 DEPRESSION WITH ANXIETY: Primary | ICD-10-CM

## 2023-05-15 RX ORDER — BUPROPION HYDROCHLORIDE 300 MG/1
300 TABLET ORAL DAILY
Qty: 30 TABLET | Status: CANCELLED | OUTPATIENT
Start: 2023-05-15

## 2023-05-15 RX ORDER — BUPROPION HYDROCHLORIDE 300 MG/1
300 TABLET ORAL DAILY
Qty: 90 TABLET | Refills: 3 | Status: SHIPPED | OUTPATIENT
Start: 2023-05-15

## 2023-05-15 NOTE — TELEPHONE ENCOUNTER
April [place], [place] 5/15/2023 6:52 AM EDT      ----- Message -----  From: Christina Montague  Sent: 5/14/2023 7:52 PM EDT  To: , *  Subject: Wellbutrin XL     Dr. Prema Corral,     I just realized I need a refill for my Wellbutrin XL 300mg once daily sent to my mail order pharmacy  [facility]. I have about a week and a half left. Thank you in advance!     Mercy Alvarado

## 2023-06-28 ENCOUNTER — PATIENT MESSAGE (OUTPATIENT)
Age: 33
End: 2023-06-28

## 2023-06-28 DIAGNOSIS — F41.8 DEPRESSION WITH ANXIETY: Primary | ICD-10-CM

## 2023-06-28 RX ORDER — METOPROLOL SUCCINATE 25 MG/1
12.5 TABLET, EXTENDED RELEASE ORAL DAILY
Qty: 45 TABLET | Refills: 3 | Status: SHIPPED | OUTPATIENT
Start: 2023-06-28

## 2023-06-29 RX ORDER — LORAZEPAM 1 MG/1
1 TABLET ORAL 2 TIMES DAILY PRN
Qty: 30 TABLET | Refills: 0 | Status: SHIPPED | OUTPATIENT
Start: 2023-06-29 | End: 2023-07-29

## 2023-08-25 SDOH — ECONOMIC STABILITY: HOUSING INSECURITY
IN THE LAST 12 MONTHS, WAS THERE A TIME WHEN YOU DID NOT HAVE A STEADY PLACE TO SLEEP OR SLEPT IN A SHELTER (INCLUDING NOW)?: NO

## 2023-08-25 SDOH — ECONOMIC STABILITY: FOOD INSECURITY: WITHIN THE PAST 12 MONTHS, YOU WORRIED THAT YOUR FOOD WOULD RUN OUT BEFORE YOU GOT MONEY TO BUY MORE.: NEVER TRUE

## 2023-08-25 SDOH — ECONOMIC STABILITY: TRANSPORTATION INSECURITY
IN THE PAST 12 MONTHS, HAS LACK OF TRANSPORTATION KEPT YOU FROM MEETINGS, WORK, OR FROM GETTING THINGS NEEDED FOR DAILY LIVING?: NO

## 2023-08-25 SDOH — ECONOMIC STABILITY: FOOD INSECURITY: WITHIN THE PAST 12 MONTHS, THE FOOD YOU BOUGHT JUST DIDN'T LAST AND YOU DIDN'T HAVE MONEY TO GET MORE.: NEVER TRUE

## 2023-08-25 SDOH — ECONOMIC STABILITY: INCOME INSECURITY: HOW HARD IS IT FOR YOU TO PAY FOR THE VERY BASICS LIKE FOOD, HOUSING, MEDICAL CARE, AND HEATING?: NOT VERY HARD

## 2023-08-28 ENCOUNTER — OFFICE VISIT (OUTPATIENT)
Age: 33
End: 2023-08-28
Payer: COMMERCIAL

## 2023-08-28 VITALS
OXYGEN SATURATION: 100 % | RESPIRATION RATE: 18 BRPM | HEART RATE: 94 BPM | HEIGHT: 66 IN | TEMPERATURE: 98.4 F | SYSTOLIC BLOOD PRESSURE: 112 MMHG | BODY MASS INDEX: 19.09 KG/M2 | WEIGHT: 118.8 LBS | DIASTOLIC BLOOD PRESSURE: 70 MMHG

## 2023-08-28 DIAGNOSIS — F41.8 DEPRESSION WITH ANXIETY: ICD-10-CM

## 2023-08-28 DIAGNOSIS — J45.20 MILD INTERMITTENT ASTHMA, UNCOMPLICATED: ICD-10-CM

## 2023-08-28 DIAGNOSIS — I10 ESSENTIAL (PRIMARY) HYPERTENSION: Primary | ICD-10-CM

## 2023-08-28 PROCEDURE — 99214 OFFICE O/P EST MOD 30 MIN: CPT | Performed by: FAMILY MEDICINE

## 2023-08-28 PROCEDURE — 3078F DIAST BP <80 MM HG: CPT | Performed by: FAMILY MEDICINE

## 2023-08-28 PROCEDURE — 3074F SYST BP LT 130 MM HG: CPT | Performed by: FAMILY MEDICINE

## 2023-08-28 SDOH — ECONOMIC STABILITY: INCOME INSECURITY: HOW HARD IS IT FOR YOU TO PAY FOR THE VERY BASICS LIKE FOOD, HOUSING, MEDICAL CARE, AND HEATING?: NOT HARD AT ALL

## 2023-08-28 SDOH — ECONOMIC STABILITY: FOOD INSECURITY: WITHIN THE PAST 12 MONTHS, YOU WORRIED THAT YOUR FOOD WOULD RUN OUT BEFORE YOU GOT MONEY TO BUY MORE.: NEVER TRUE

## 2023-08-28 SDOH — ECONOMIC STABILITY: FOOD INSECURITY: WITHIN THE PAST 12 MONTHS, THE FOOD YOU BOUGHT JUST DIDN'T LAST AND YOU DIDN'T HAVE MONEY TO GET MORE.: NEVER TRUE

## 2023-08-28 ASSESSMENT — PATIENT HEALTH QUESTIONNAIRE - PHQ9
6. FEELING BAD ABOUT YOURSELF - OR THAT YOU ARE A FAILURE OR HAVE LET YOURSELF OR YOUR FAMILY DOWN: 0
7. TROUBLE CONCENTRATING ON THINGS, SUCH AS READING THE NEWSPAPER OR WATCHING TELEVISION: 0
SUM OF ALL RESPONSES TO PHQ QUESTIONS 1-9: 1
1. LITTLE INTEREST OR PLEASURE IN DOING THINGS: 0
8. MOVING OR SPEAKING SO SLOWLY THAT OTHER PEOPLE COULD HAVE NOTICED. OR THE OPPOSITE, BEING SO FIGETY OR RESTLESS THAT YOU HAVE BEEN MOVING AROUND A LOT MORE THAN USUAL: 0
2. FEELING DOWN, DEPRESSED OR HOPELESS: 1
10. IF YOU CHECKED OFF ANY PROBLEMS, HOW DIFFICULT HAVE THESE PROBLEMS MADE IT FOR YOU TO DO YOUR WORK, TAKE CARE OF THINGS AT HOME, OR GET ALONG WITH OTHER PEOPLE: 0
4. FEELING TIRED OR HAVING LITTLE ENERGY: 0
SUM OF ALL RESPONSES TO PHQ9 QUESTIONS 1 & 2: 1
SUM OF ALL RESPONSES TO PHQ QUESTIONS 1-9: 1
9. THOUGHTS THAT YOU WOULD BE BETTER OFF DEAD, OR OF HURTING YOURSELF: 0
2. FEELING DOWN, DEPRESSED OR HOPELESS: 1
SUM OF ALL RESPONSES TO PHQ QUESTIONS 1-9: 1
1. LITTLE INTEREST OR PLEASURE IN DOING THINGS: 0
3. TROUBLE FALLING OR STAYING ASLEEP: 0
SUM OF ALL RESPONSES TO PHQ QUESTIONS 1-9: 1
5. POOR APPETITE OR OVEREATING: 0
SUM OF ALL RESPONSES TO PHQ9 QUESTIONS 1 & 2: 1
SUM OF ALL RESPONSES TO PHQ QUESTIONS 1-9: 1
SUM OF ALL RESPONSES TO PHQ QUESTIONS 1-9: 1

## 2023-08-28 NOTE — PATIENT INSTRUCTIONS
Please consider getting the prevnar 20 for prevention of the pneumococcal infections like pneumonia, it is safe to give with flu shot. You are also due for a tetanus booster, any of them (Td or TdAP) will do.

## 2023-08-29 LAB
BUN SERPL-MCNC: 15 MG/DL (ref 6–20)
BUN/CREAT SERPL: 18 (ref 9–23)
CALCIUM SERPL-MCNC: 9.8 MG/DL (ref 8.7–10.2)
CHLORIDE SERPL-SCNC: 100 MMOL/L (ref 96–106)
CO2 SERPL-SCNC: 23 MMOL/L (ref 20–29)
CREAT SERPL-MCNC: 0.82 MG/DL (ref 0.57–1)
EGFRCR SERPLBLD CKD-EPI 2021: 97 ML/MIN/1.73
GLUCOSE SERPL-MCNC: 73 MG/DL (ref 70–99)
POTASSIUM SERPL-SCNC: 4 MMOL/L (ref 3.5–5.2)
SODIUM SERPL-SCNC: 139 MMOL/L (ref 134–144)
SPECIMEN STATUS REPORT: NORMAL

## 2023-11-08 ENCOUNTER — NURSE ONLY (OUTPATIENT)
Age: 33
End: 2023-11-08
Payer: COMMERCIAL

## 2023-11-08 DIAGNOSIS — Z23 NEED FOR DIPHTHERIA-TETANUS-PERTUSSIS (TDAP) VACCINE: ICD-10-CM

## 2023-11-08 DIAGNOSIS — Z23 NEEDS FLU SHOT: Primary | ICD-10-CM

## 2023-11-08 PROCEDURE — 90674 CCIIV4 VAC NO PRSV 0.5 ML IM: CPT | Performed by: FAMILY MEDICINE

## 2023-11-08 PROCEDURE — 90471 IMMUNIZATION ADMIN: CPT | Performed by: FAMILY MEDICINE

## 2023-11-08 PROCEDURE — 90472 IMMUNIZATION ADMIN EACH ADD: CPT | Performed by: FAMILY MEDICINE

## 2023-11-08 PROCEDURE — 90715 TDAP VACCINE 7 YRS/> IM: CPT | Performed by: FAMILY MEDICINE

## 2023-12-26 ENCOUNTER — OFFICE VISIT (OUTPATIENT)
Age: 33
End: 2023-12-26
Payer: COMMERCIAL

## 2023-12-26 DIAGNOSIS — J02.9 SORE THROAT: Primary | ICD-10-CM

## 2023-12-26 DIAGNOSIS — R50.9 FEVER, UNSPECIFIED FEVER CAUSE: ICD-10-CM

## 2023-12-26 DIAGNOSIS — J02.0 ACUTE STREPTOCOCCAL PHARYNGITIS: ICD-10-CM

## 2023-12-26 LAB
EXP DATE SOLUTION: NORMAL
EXP DATE SWAB: NORMAL
EXPIRATION DATE: NORMAL
INFLUENZA A ANTIGEN, POC: NEGATIVE
INFLUENZA B ANTIGEN, POC: NEGATIVE
LOT NUMBER POC: NORMAL
LOT NUMBER SOLUTION: NORMAL
LOT NUMBER SWAB: NORMAL
SARS-COV-2 RNA, POC: NEGATIVE
STREP PYOGENES DNA, POC: POSITIVE
VALID INTERNAL CONTROL, POC: NORMAL
VALID INTERNAL CONTROL, POC: YES

## 2023-12-26 PROCEDURE — 87651 STREP A DNA AMP PROBE: CPT | Performed by: NURSE PRACTITIONER

## 2023-12-26 PROCEDURE — 87502 INFLUENZA DNA AMP PROBE: CPT | Performed by: NURSE PRACTITIONER

## 2023-12-26 PROCEDURE — 99213 OFFICE O/P EST LOW 20 MIN: CPT | Performed by: NURSE PRACTITIONER

## 2023-12-26 PROCEDURE — 87635 SARS-COV-2 COVID-19 AMP PRB: CPT | Performed by: NURSE PRACTITIONER

## 2023-12-26 RX ORDER — CEPHALEXIN 500 MG/1
500 CAPSULE ORAL 3 TIMES DAILY
Qty: 21 CAPSULE | Refills: 0 | Status: SHIPPED | OUTPATIENT
Start: 2023-12-26 | End: 2024-01-02

## 2023-12-26 NOTE — PROGRESS NOTES
Chief Complaint   Patient presents with    Chest Congestion     C/O cough, congestion, fever fatigue sore throat  (-) home COVID x 3 (+) exposure  Taking tylenol and ibuprofen         HPI:      Christelle Fink is a 29 y.o. female. New Issues:  She was exposed to her niece last week that was POS for COVID. She has had a sore throat for several days. Mild nasal congestion. Fever up to 103. Home COVID tests have been negative. Taking Tylenol and Ibuprofen at home. Works at a PT office.      Allergies   Allergen Reactions    Metoclopramide Anaphylaxis    Gadolinium Derivatives Other (See Comments)     Redness over chest area-pre medicate    Penicillins Rash    Sulfa Antibiotics Rash       Current Outpatient Medications   Medication Sig Dispense Refill    cephALEXin (KEFLEX) 500 MG capsule Take 1 capsule by mouth 3 times daily for 7 days 21 capsule 0    metoprolol succinate (TOPROL XL) 25 MG extended release tablet Take 0.5 tablets by mouth daily 45 tablet 3    buPROPion (WELLBUTRIN XL) 300 MG extended release tablet Take 1 tablet by mouth daily Indications: nerves and depression 90 tablet 3    albuterol sulfate HFA (PROVENTIL;VENTOLIN;PROAIR) 108 (90 Base) MCG/ACT inhaler Inhale 2 puffs into the lungs every 4 hours as needed      Budeson-Glycopyrrol-Formoterol (BREZTRI AEROSPHERE) 160-9-4.8 MCG/ACT AERO Inhale 2 puffs into the lungs 2 times daily as needed      Cetirizine HCl 10 MG CAPS Take by mouth      escitalopram (LEXAPRO) 20 MG tablet Take 1 tablet by mouth daily      meclizine (ANTIVERT) 25 MG tablet Take 1 tablet by mouth 3 times daily as needed      norethindrone (MICRONOR) 0.35 MG tablet Take 1 tablet by mouth daily      ondansetron (ZOFRAN) 4 MG tablet Take 1 tablet by mouth every 8 hours as needed      polyethylene glycol (GLYCOLAX) 17 GM/SCOOP powder Take 17 g by mouth daily      predniSONE (DELTASONE) 20 MG tablet Take 1 tablet by mouth daily as needed      SUMAtriptan (IMITREX) 50 MG tablet Take 1

## 2024-01-19 ENCOUNTER — OFFICE VISIT (OUTPATIENT)
Age: 34
End: 2024-01-19
Payer: COMMERCIAL

## 2024-01-19 VITALS — HEART RATE: 83 BPM | OXYGEN SATURATION: 99 % | TEMPERATURE: 97.5 F

## 2024-01-19 DIAGNOSIS — J02.9 SORE THROAT: Primary | ICD-10-CM

## 2024-01-19 DIAGNOSIS — J06.9 VIRAL URI: ICD-10-CM

## 2024-01-19 LAB
EXP DATE SOLUTION: NORMAL
EXP DATE SWAB: NORMAL
EXPIRATION DATE: NORMAL
GROUP A STREP ANTIGEN, POC: NEGATIVE
INFLUENZA A ANTIGEN, POC: NEGATIVE
INFLUENZA B ANTIGEN, POC: NEGATIVE
LOT NUMBER POC: NORMAL
LOT NUMBER SOLUTION: NORMAL
LOT NUMBER SWAB: NORMAL
SARS-COV-2 RNA, POC: NEGATIVE
VALID INTERNAL CONTROL, POC: NORMAL
VALID INTERNAL CONTROL, POC: NORMAL

## 2024-01-19 PROCEDURE — 87502 INFLUENZA DNA AMP PROBE: CPT | Performed by: NURSE PRACTITIONER

## 2024-01-19 PROCEDURE — 87880 STREP A ASSAY W/OPTIC: CPT | Performed by: NURSE PRACTITIONER

## 2024-01-19 PROCEDURE — 87635 SARS-COV-2 COVID-19 AMP PRB: CPT | Performed by: NURSE PRACTITIONER

## 2024-01-19 PROCEDURE — 99213 OFFICE O/P EST LOW 20 MIN: CPT | Performed by: NURSE PRACTITIONER

## 2024-01-19 ASSESSMENT — PATIENT HEALTH QUESTIONNAIRE - PHQ9
1. LITTLE INTEREST OR PLEASURE IN DOING THINGS: 0
SUM OF ALL RESPONSES TO PHQ QUESTIONS 1-9: 0
9. THOUGHTS THAT YOU WOULD BE BETTER OFF DEAD, OR OF HURTING YOURSELF: 0
5. POOR APPETITE OR OVEREATING: 0
SUM OF ALL RESPONSES TO PHQ QUESTIONS 1-9: 0
SUM OF ALL RESPONSES TO PHQ9 QUESTIONS 1 & 2: 0
2. FEELING DOWN, DEPRESSED OR HOPELESS: 0
7. TROUBLE CONCENTRATING ON THINGS, SUCH AS READING THE NEWSPAPER OR WATCHING TELEVISION: 0
SUM OF ALL RESPONSES TO PHQ QUESTIONS 1-9: 0
6. FEELING BAD ABOUT YOURSELF - OR THAT YOU ARE A FAILURE OR HAVE LET YOURSELF OR YOUR FAMILY DOWN: 0
3. TROUBLE FALLING OR STAYING ASLEEP: 0
10. IF YOU CHECKED OFF ANY PROBLEMS, HOW DIFFICULT HAVE THESE PROBLEMS MADE IT FOR YOU TO DO YOUR WORK, TAKE CARE OF THINGS AT HOME, OR GET ALONG WITH OTHER PEOPLE: 0
8. MOVING OR SPEAKING SO SLOWLY THAT OTHER PEOPLE COULD HAVE NOTICED. OR THE OPPOSITE, BEING SO FIGETY OR RESTLESS THAT YOU HAVE BEEN MOVING AROUND A LOT MORE THAN USUAL: 0
4. FEELING TIRED OR HAVING LITTLE ENERGY: 0
SUM OF ALL RESPONSES TO PHQ QUESTIONS 1-9: 0

## 2024-01-19 NOTE — PROGRESS NOTES
Pascale López is a 33 y.o. female presenting for/with:    Chief Complaint   Patient presents with    Pharyngitis     Pt c/o having a sore throat  and congestion x 1 week. Pt reports having strep at [place]. Pt denies taking any OTC medications.        Vitals:    01/19/24 1039   Pulse: 83   Temp: 97.5 °F (36.4 °C)   TempSrc: Temporal   SpO2: 99%       Pain Scale: 2/10  Pain Location: Throat    \"Have you been to the ER, urgent care clinic since your last visit?  Hospitalized since your last visit?\"    NO    “Have you seen or consulted any other health care providers outside of [facility] since your last visit?”    NO                 1/19/2024    10:41 AM   PHQ-9    Little interest or pleasure in doing things 0   Feeling down, depressed, or hopeless 0   Trouble falling or staying asleep, or sleeping too much 0   Feeling tired or having little energy 0   Poor appetite or overeating 0   Feeling bad about yourself - or that you are a failure or have let yourself or your family down 0   Trouble concentrating on things, such as reading the newspaper or watching television 0   Moving or speaking so slowly that other people could have noticed. Or the opposite - being so fidgety or restless that you have been moving around a lot more than usual 0   Thoughts that you would be better off dead, or of hurting yourself in some way 0   PHQ-2 Score 0   PHQ-9 Total Score 0   If you checked off any problems, how difficult have these problems made it for you to do your work, take care of things at home, or get along with other people? 0           8/28/2023     7:30 AM 2/15/2023    12:00 AM   [facility] AMB LEARNING ASSESSMENT   Primary Learner Patient Patient   Primary Language ENGLISH ENGLISH   Learning Preference DEMONSTRATION DEMONSTRATION   Answered By patient patient   Relationship to Learner SELF SELF            1/19/2024    10:40 AM   Amb Fall Risk Assessment and TUG Test   Do you feel unsteady or are you worried

## 2024-01-19 NOTE — PROGRESS NOTES
Chief Complaint   Patient presents with    Pharyngitis     Pt c/o having a sore throat  and congestion x 1 week. Pt reports having strep at [place]. Pt denies taking any OTC medications.          HPI:       is a 33 y.o. female.      New Issues:  She is here for an acute visit.  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  She is c/o a sore throat and sinus congestion for the past few days.  Ears are itching.  Low grade fever at home around 99. Had a severe case of Strep around [place].  Reports a chronic PND.  Takes Cetirizine for this and has been on Breztri intermittently.     Allergies   Allergen Reactions    Metoclopramide Anaphylaxis    Gadolinium Derivatives Other (See Comments)     Redness over chest area-pre medicate    Penicillins Rash    Sulfa Antibiotics Rash       Current Outpatient Medications   Medication Sig Dispense Refill    metoprolol succinate (TOPROL XL) 25 MG extended release tablet Take 0.5 tablets by mouth daily 45 tablet 3    buPROPion (WELLBUTRIN XL) 300 MG extended release tablet Take 1 tablet by mouth daily Indications: nerves and depression 90 tablet 3    albuterol sulfate HFA (PROVENTIL;VENTOLIN;PROAIR) 108 (90 Base) MCG/ACT inhaler Inhale 2 puffs into the lungs every 4 hours as needed      Budeson-Glycopyrrol-Formoterol (BREZTRI AEROSPHERE) 160-9-4.8 MCG/ACT AERO Inhale 2 puffs into the lungs 2 times daily as needed      Cetirizine HCl 10 MG CAPS Take by mouth      escitalopram (LEXAPRO) 20 MG tablet Take 1 tablet by mouth daily      meclizine (ANTIVERT) 25 MG tablet Take 1 tablet by mouth 3 times daily as needed      norethindrone (MICRONOR) 0.35 MG tablet Take 1 tablet by mouth daily      ondansetron (ZOFRAN) 4 MG tablet Take 1 tablet by mouth every 8 hours as needed      polyethylene glycol (GLYCOLAX) 17 GM/SCOOP powder Take 17 g by mouth daily      predniSONE (DELTASONE) 20 MG tablet Take 1 tablet by mouth daily as needed      SUMAtriptan (IMITREX) 50 MG

## 2024-02-28 ENCOUNTER — OFFICE VISIT (OUTPATIENT)
Age: 34
End: 2024-02-28
Payer: COMMERCIAL

## 2024-02-28 VITALS
DIASTOLIC BLOOD PRESSURE: 68 MMHG | RESPIRATION RATE: 18 BRPM | TEMPERATURE: 97.8 F | WEIGHT: 119.6 LBS | HEIGHT: 66 IN | HEART RATE: 85 BPM | OXYGEN SATURATION: 99 % | SYSTOLIC BLOOD PRESSURE: 95 MMHG | BODY MASS INDEX: 19.22 KG/M2

## 2024-02-28 DIAGNOSIS — I10 ESSENTIAL HYPERTENSION: Primary | ICD-10-CM

## 2024-02-28 DIAGNOSIS — H81.09 MENIERE'S DISEASE, UNSPECIFIED LATERALITY: ICD-10-CM

## 2024-02-28 DIAGNOSIS — J45.20 MILD INTERMITTENT ASTHMA WITHOUT COMPLICATION: ICD-10-CM

## 2024-02-28 DIAGNOSIS — F41.8 DEPRESSION WITH ANXIETY: ICD-10-CM

## 2024-02-28 PROCEDURE — 3074F SYST BP LT 130 MM HG: CPT | Performed by: FAMILY MEDICINE

## 2024-02-28 PROCEDURE — 99213 OFFICE O/P EST LOW 20 MIN: CPT | Performed by: FAMILY MEDICINE

## 2024-02-28 PROCEDURE — 3078F DIAST BP <80 MM HG: CPT | Performed by: FAMILY MEDICINE

## 2024-02-28 RX ORDER — BUPROPION HYDROCHLORIDE 300 MG/1
300 TABLET ORAL DAILY
Qty: 90 TABLET | Refills: 3 | Status: SHIPPED | OUTPATIENT
Start: 2024-02-28

## 2024-02-28 RX ORDER — METOPROLOL SUCCINATE 25 MG/1
12.5 TABLET, EXTENDED RELEASE ORAL DAILY
Qty: 45 TABLET | Refills: 3 | Status: SHIPPED | OUTPATIENT
Start: 2024-02-28

## 2024-02-28 RX ORDER — TRIAMTERENE AND HYDROCHLOROTHIAZIDE 37.5; 25 MG/1; MG/1
.5-1 TABLET ORAL DAILY
Qty: 90 TABLET | Refills: 3 | Status: SHIPPED | OUTPATIENT
Start: 2024-02-28

## 2024-02-28 RX ORDER — ESCITALOPRAM OXALATE 20 MG/1
20 TABLET ORAL DAILY
Qty: 90 TABLET | Refills: 3 | Status: SHIPPED | OUTPATIENT
Start: 2024-02-28

## 2024-02-28 ASSESSMENT — PATIENT HEALTH QUESTIONNAIRE - PHQ9
SUM OF ALL RESPONSES TO PHQ QUESTIONS 1-9: 0
9. THOUGHTS THAT YOU WOULD BE BETTER OFF DEAD, OR OF HURTING YOURSELF: 0
SUM OF ALL RESPONSES TO PHQ QUESTIONS 1-9: 0
5. POOR APPETITE OR OVEREATING: 0
SUM OF ALL RESPONSES TO PHQ QUESTIONS 1-9: 0
4. FEELING TIRED OR HAVING LITTLE ENERGY: 0
6. FEELING BAD ABOUT YOURSELF - OR THAT YOU ARE A FAILURE OR HAVE LET YOURSELF OR YOUR FAMILY DOWN: 0
8. MOVING OR SPEAKING SO SLOWLY THAT OTHER PEOPLE COULD HAVE NOTICED. OR THE OPPOSITE, BEING SO FIGETY OR RESTLESS THAT YOU HAVE BEEN MOVING AROUND A LOT MORE THAN USUAL: 0
SUM OF ALL RESPONSES TO PHQ QUESTIONS 1-9: 0
10. IF YOU CHECKED OFF ANY PROBLEMS, HOW DIFFICULT HAVE THESE PROBLEMS MADE IT FOR YOU TO DO YOUR WORK, TAKE CARE OF THINGS AT HOME, OR GET ALONG WITH OTHER PEOPLE: 0
3. TROUBLE FALLING OR STAYING ASLEEP: 0
1. LITTLE INTEREST OR PLEASURE IN DOING THINGS: 0
SUM OF ALL RESPONSES TO PHQ9 QUESTIONS 1 & 2: 0
2. FEELING DOWN, DEPRESSED OR HOPELESS: 0
7. TROUBLE CONCENTRATING ON THINGS, SUCH AS READING THE NEWSPAPER OR WATCHING TELEVISION: 0

## 2024-02-28 NOTE — PATIENT INSTRUCTIONS
Pneumococcal 20, COVID omicron vaccines due at your pharmacy. Please get when available, can help prevent severe lung disease.

## 2024-02-28 NOTE — PROGRESS NOTES
Pascale López is a 33 y.o. female presenting for/with:    Chief Complaint   Patient presents with    Hypertension    Depression    Medication Refill     Will need refills on Metoprolol, Lexapro, Wellbutrin and triamterene-hctz       Vitals:    02/28/24 0730   BP: 95/68   Site: Left Upper Arm   Position: Sitting   Pulse: 85   Resp: 18   Temp: 97.8 °F (36.6 °C)   TempSrc: Temporal   SpO2: 99%   Weight: 54.3 kg (119 lb 9.6 oz)   Height: 1.676 m (5' 6\")       Pain Scale: 0 - No pain/10  Pain Location:     \"Have you been to the ER, urgent care clinic since your last visit?  Hospitalized since your last visit?\"    NO    “Have you seen or consulted any other health care providers outside of [facility] since your last visit?”    NO                 1/19/2024    10:41 AM   PHQ-9    Little interest or pleasure in doing things 0   Feeling down, depressed, or hopeless 0   Trouble falling or staying asleep, or sleeping too much 0   Feeling tired or having little energy 0   Poor appetite or overeating 0   Feeling bad about yourself - or that you are a failure or have let yourself or your family down 0   Trouble concentrating on things, such as reading the newspaper or watching television 0   Moving or speaking so slowly that other people could have noticed. Or the opposite - being so fidgety or restless that you have been moving around a lot more than usual 0   Thoughts that you would be better off dead, or of hurting yourself in some way 0   PHQ-2 Score 0   PHQ-9 Total Score 0   If you checked off any problems, how difficult have these problems made it for you to do your work, take care of things at home, or get along with other people? 0           8/28/2023     7:30 AM 2/15/2023    12:00 AM   [facility] AMB LEARNING ASSESSMENT   Primary Learner Patient Patient   Primary Language ENGLISH ENGLISH   Learning Preference DEMONSTRATION DEMONSTRATION   Answered By patient patient   Relationship to Learner SELF SELF

## 2024-02-28 NOTE — PROGRESS NOTES
Pascale López is a 33 y.o. female  Chief Complaint   Patient presents with    Hypertension    Depression    Medication Refill     Will need refills on Metoprolol, Lexapro, Wellbutrin and triamterene-hctz     HPI:  Hypertension.   Blood pressures in goal to a little low. Management at last visit included continuing current regimen . . Current regimen: maxzide, toprol XL. Symptoms include no symptoms. Patient denies chest pain, palpitations. She is on Maxzide also for meniere's disease and is well controlled on that, usually takes QOD, jason well despite BP's on the low side.  Lab review:   Lab Results   Component Value Date     08/28/2023    K 4.0 08/28/2023     08/28/2023    CO2 23 08/28/2023    GLUCOSE 73 08/28/2023    BUN 15 08/28/2023    CREATININE 0.82 08/28/2023     Depression  On lexapro, ativan, wellbutrin XL. Working pretty good for now, happy with current regimen.   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 Followed by Neurosurgery Dr. Pollard. Starts in the centrum semiovale and has an irregular patchy appearance. Extends for about 1 cm anterior to posterior and extends 3 to 4 cm laterally into the precentral gyrus in the lateral frontal lobe.     Asthma  Current control: Good   Current level: mild intermittent  Current symptoms: none  Current controller: On Breztri, jason well.  Current side effects: none  Last flareup: Fall 2022.  Number of flareups in past year:3  Current symptom relief med: albuterol    PMH, SH, Medications/Allergies: reviewed, on chart. Works at  with [employer].    Current Outpatient Medications   Medication Sig    metoprolol succinate (TOPROL XL) 25 MG extended release tablet Take 0.5 tablets by mouth daily    buPROPion (WELLBUTRIN XL) 300 MG extended release tablet Take 1 tablet by mouth daily

## 2024-04-01 ENCOUNTER — PATIENT MESSAGE (OUTPATIENT)
Age: 34
End: 2024-04-01

## 2024-04-01 RX ORDER — SUMATRIPTAN 50 MG/1
50 TABLET, FILM COATED ORAL DAILY PRN
Qty: 9 TABLET | Refills: 3 | Status: SHIPPED | OUTPATIENT
Start: 2024-04-01

## 2024-04-01 NOTE — TELEPHONE ENCOUNTER
From: Pascale López  To: Dr. Johan Ch  Sent: 4/1/2024 11:59 AM EDT  Subject: Sumatriptan refill     Hi Dr. Ch!    I had a migraine flare this past weekend and had to take my Sumatriptan 50mg twice. I still have 3 pills left from the RX I got filled 2 years ago but figured I should request a refill from you to have on hand just in case!    The current RX I have is for Sumatriptan Succ 50mg tablet; #1 tab at onset of migraine, May repeat once after 2 hours if needed. Max of 2 per 24 hrs. Quantity was 9 tablets.     Let me know if you have any questions!    Thank you,    Pascale López

## 2024-05-14 ENCOUNTER — OFFICE VISIT (OUTPATIENT)
Age: 34
End: 2024-05-14
Payer: COMMERCIAL

## 2024-05-14 VITALS — HEART RATE: 96 BPM | RESPIRATION RATE: 18 BRPM | OXYGEN SATURATION: 99 % | TEMPERATURE: 97.3 F

## 2024-05-14 DIAGNOSIS — J06.9 UPPER RESPIRATORY TRACT INFECTION, UNSPECIFIED TYPE: ICD-10-CM

## 2024-05-14 DIAGNOSIS — J02.9 SORE THROAT: Primary | ICD-10-CM

## 2024-05-14 LAB
STREP PYOGENES DNA, POC: NEGATIVE
VALID INTERNAL CONTROL, POC: YES

## 2024-05-14 PROCEDURE — 99213 OFFICE O/P EST LOW 20 MIN: CPT | Performed by: NURSE PRACTITIONER

## 2024-05-14 PROCEDURE — 87651 STREP A DNA AMP PROBE: CPT | Performed by: NURSE PRACTITIONER

## 2024-05-14 RX ORDER — AZITHROMYCIN 250 MG/1
TABLET, FILM COATED ORAL
Qty: 6 TABLET | Refills: 0 | Status: SHIPPED | OUTPATIENT
Start: 2024-05-14 | End: 2024-05-24

## 2024-05-14 ASSESSMENT — PATIENT HEALTH QUESTIONNAIRE - PHQ9
SUM OF ALL RESPONSES TO PHQ QUESTIONS 1-9: 0
SUM OF ALL RESPONSES TO PHQ9 QUESTIONS 1 & 2: 0
SUM OF ALL RESPONSES TO PHQ QUESTIONS 1-9: 0
1. LITTLE INTEREST OR PLEASURE IN DOING THINGS: NOT AT ALL
SUM OF ALL RESPONSES TO PHQ QUESTIONS 1-9: 0
SUM OF ALL RESPONSES TO PHQ QUESTIONS 1-9: 0
2. FEELING DOWN, DEPRESSED OR HOPELESS: NOT AT ALL

## 2024-05-14 NOTE — PROGRESS NOTES
Results for orders placed or performed in visit on 05/14/24   AMB POC STREP GO A DIRECT, DNA PROBE   Result Value Ref Range    Valid Internal Control, POC yes     Strep pyogenes DNA, POC Negative

## 2024-05-14 NOTE — PROGRESS NOTES
Pascale López is a 34 y.o. female presenting for/with:    Chief Complaint   Patient presents with    Sore Throat     Very raw sore throat x 2 days.     Cough     Slight cough starting this morning.     Fever     Possible low grade fever.        Vitals:    05/14/24 0900   Pulse: 96   Resp: 18   Temp: 97.3 °F (36.3 °C)   TempSrc: Temporal   SpO2: 99%       Pain Scale: /10  Pain Location:     \"Have you been to the ER, urgent care clinic since your last visit?  Hospitalized since your last visit?\"    NO    “Have you seen or consulted any other health care providers outside of [facility] since your last visit?”    NO                 5/14/2024     9:20 AM   PHQ-9    Little interest or pleasure in doing things 0   Feeling down, depressed, or hopeless 0   PHQ-2 Score 0   PHQ-9 Total Score 0           8/28/2023     7:30 AM 2/15/2023    12:00 AM   [facility] AMB LEARNING ASSESSMENT   Primary Learner Patient Patient   Primary Language ENGLISH ENGLISH   Learning Preference DEMONSTRATION DEMONSTRATION   Answered By patient patient   Relationship to Learner SELF SELF            5/14/2024     9:20 AM   Amb Fall Risk Assessment and TUG Test   Do you feel unsteady or are you worried about falling?  no   2 or more falls in past year? no   Fall with injury in past year? no           5/14/2024     9:00 AM 2/28/2024     7:00 AM 1/19/2024    10:00 AM 8/28/2023     7:00 AM   ADL ASSESSMENT   Feeding yourself No Help Needed No Help Needed No Help Needed No Help Needed   Getting from bed to chair No Help Needed No Help Needed No Help Needed No Help Needed   Getting dressed No Help Needed No Help Needed No Help Needed No Help Needed   Bathing or showering No Help Needed No Help Needed No Help Needed No Help Needed   Walk across the room (includes cane/walker) No Help Needed No Help Needed No Help Needed No Help Needed   Using the telphone No Help Needed No Help Needed No Help Needed No Help Needed   Taking your medications No Help

## 2024-05-14 NOTE — PROGRESS NOTES
Chief Complaint   Patient presents with    Sore Throat     Very raw sore throat x 2 days.     Cough     Slight cough starting this morning.     Fever     Possible low grade fever.          HPI:       is a 34 y.o. female.      New Issues:  She is here for an acute visit.  She has been sick since yesterday.  C/o severe sore throat.  Has a slight cough that started this morning.  Thinks she has had a low grade fever.      Allergies   Allergen Reactions    Metoclopramide Anaphylaxis    Gadolinium Derivatives Other (See Comments)     Redness over chest area-pre medicate    Penicillins Rash    Sulfa Antibiotics Rash       Current Outpatient Medications   Medication Sig Dispense Refill    SUMAtriptan (IMITREX) 50 MG tablet Take 1 tablet by mouth daily as needed for Migraine 9 tablet 3    buPROPion (WELLBUTRIN XL) 300 MG extended release tablet Take 1 tablet by mouth daily Indications: nerves and depression 90 tablet 3    escitalopram (LEXAPRO) 20 MG tablet Take 1 tablet by mouth daily 90 tablet 3    metoprolol succinate (TOPROL XL) 25 MG extended release tablet Take 0.5 tablets by mouth daily 45 tablet 3    triamterene-hydroCHLOROthiazide (MAXZIDE-25) 37.5-25 MG per tablet Take 0.5-1 tablets by mouth daily 90 tablet 3    albuterol sulfate HFA (PROVENTIL;VENTOLIN;PROAIR) 108 (90 Base) MCG/ACT inhaler Inhale 2 puffs into the lungs every 4 hours as needed      Budeson-Glycopyrrol-Formoterol (BREZTRI AEROSPHERE) 160-9-4.8 MCG/ACT AERO Inhale 2 puffs into the lungs 2 times daily as needed      Cetirizine HCl 10 MG CAPS Take by mouth      meclizine (ANTIVERT) 25 MG tablet Take 1 tablet by mouth 3 times daily as needed      norethindrone (MICRONOR) 0.35 MG tablet Take 1 tablet by mouth daily      ondansetron (ZOFRAN) 4 MG tablet Take 1 tablet by mouth every 8 hours as needed      polyethylene glycol (GLYCOLAX) 17 GM/SCOOP powder Take 17 g by mouth daily       No current facility-administered medications for this visit.

## 2024-08-30 ENCOUNTER — OFFICE VISIT (OUTPATIENT)
Age: 34
End: 2024-08-30

## 2024-08-30 VITALS
WEIGHT: 120.4 LBS | TEMPERATURE: 98.2 F | BODY MASS INDEX: 19.35 KG/M2 | HEART RATE: 82 BPM | RESPIRATION RATE: 18 BRPM | DIASTOLIC BLOOD PRESSURE: 67 MMHG | OXYGEN SATURATION: 99 % | SYSTOLIC BLOOD PRESSURE: 101 MMHG | HEIGHT: 66 IN

## 2024-08-30 DIAGNOSIS — Z23 ENCOUNTER FOR IMMUNIZATION: ICD-10-CM

## 2024-08-30 DIAGNOSIS — F41.8 DEPRESSION WITH ANXIETY: ICD-10-CM

## 2024-08-30 DIAGNOSIS — J45.20 MILD INTERMITTENT ASTHMA WITHOUT COMPLICATION: Primary | ICD-10-CM

## 2024-08-30 DIAGNOSIS — I10 ESSENTIAL HYPERTENSION: ICD-10-CM

## 2024-08-30 RX ORDER — BUDESONIDE, GLYCOPYRROLATE, AND FORMOTEROL FUMARATE 160; 9; 4.8 UG/1; UG/1; UG/1
2 AEROSOL, METERED RESPIRATORY (INHALATION) 2 TIMES DAILY
Qty: 10.7 G | Refills: 11 | Status: SHIPPED | OUTPATIENT
Start: 2024-08-30

## 2024-08-30 RX ORDER — LORAZEPAM 1 MG/1
1 TABLET ORAL 2 TIMES DAILY PRN
Qty: 30 TABLET | Refills: 0 | COMMUNITY
Start: 2024-08-30 | End: 2024-08-30

## 2024-08-30 SDOH — ECONOMIC STABILITY: FOOD INSECURITY: WITHIN THE PAST 12 MONTHS, THE FOOD YOU BOUGHT JUST DIDN'T LAST AND YOU DIDN'T HAVE MONEY TO GET MORE.: NEVER TRUE

## 2024-08-30 SDOH — ECONOMIC STABILITY: FOOD INSECURITY: WITHIN THE PAST 12 MONTHS, YOU WORRIED THAT YOUR FOOD WOULD RUN OUT BEFORE YOU GOT MONEY TO BUY MORE.: NEVER TRUE

## 2024-08-30 SDOH — ECONOMIC STABILITY: INCOME INSECURITY: HOW HARD IS IT FOR YOU TO PAY FOR THE VERY BASICS LIKE FOOD, HOUSING, MEDICAL CARE, AND HEATING?: NOT HARD AT ALL

## 2024-08-30 ASSESSMENT — PATIENT HEALTH QUESTIONNAIRE - PHQ9
SUM OF ALL RESPONSES TO PHQ9 QUESTIONS 1 & 2: 0
3. TROUBLE FALLING OR STAYING ASLEEP: NOT AT ALL
SUM OF ALL RESPONSES TO PHQ QUESTIONS 1-9: 0
SUM OF ALL RESPONSES TO PHQ QUESTIONS 1-9: 0
2. FEELING DOWN, DEPRESSED OR HOPELESS: NOT AT ALL
4. FEELING TIRED OR HAVING LITTLE ENERGY: NOT AT ALL
SUM OF ALL RESPONSES TO PHQ QUESTIONS 1-9: 0
SUM OF ALL RESPONSES TO PHQ QUESTIONS 1-9: 0
6. FEELING BAD ABOUT YOURSELF - OR THAT YOU ARE A FAILURE OR HAVE LET YOURSELF OR YOUR FAMILY DOWN: NOT AT ALL
8. MOVING OR SPEAKING SO SLOWLY THAT OTHER PEOPLE COULD HAVE NOTICED. OR THE OPPOSITE, BEING SO FIGETY OR RESTLESS THAT YOU HAVE BEEN MOVING AROUND A LOT MORE THAN USUAL: NOT AT ALL
1. LITTLE INTEREST OR PLEASURE IN DOING THINGS: NOT AT ALL
7. TROUBLE CONCENTRATING ON THINGS, SUCH AS READING THE NEWSPAPER OR WATCHING TELEVISION: NOT AT ALL
9. THOUGHTS THAT YOU WOULD BE BETTER OFF DEAD, OR OF HURTING YOURSELF: NOT AT ALL
10. IF YOU CHECKED OFF ANY PROBLEMS, HOW DIFFICULT HAVE THESE PROBLEMS MADE IT FOR YOU TO DO YOUR WORK, TAKE CARE OF THINGS AT HOME, OR GET ALONG WITH OTHER PEOPLE: NOT DIFFICULT AT ALL
5. POOR APPETITE OR OVEREATING: NOT AT ALL

## 2024-08-30 NOTE — PROGRESS NOTES
Pascale López is a 34 y.o. female presenting for/with:    Chief Complaint   Patient presents with    Hypertension     6 month F/U    Depression     Wants to discuss situation depression.   Still has lorazepam from last fill.        Vitals:    08/30/24 1547   BP: 101/67   Site: Left Upper Arm   Position: Sitting   Cuff Size: Medium Adult   Pulse: 82   Resp: 18   Temp: 98.2 °F (36.8 °C)   TempSrc: Temporal   SpO2: 99%   Weight: 54.6 kg (120 lb 6.4 oz)   Height: 1.676 m (5' 6\")       Pain Scale: 0 - No pain/10  Pain Location:     \"Have you been to the ER, urgent care clinic since your last visit?  Hospitalized since your last visit?\"    NO    “Have you seen or consulted any other health care providers outside of [facility] since your last visit?”    NO                 8/30/2024     3:42 PM   PHQ-9    Little interest or pleasure in doing things 0   Feeling down, depressed, or hopeless 0   Trouble falling or staying asleep, or sleeping too much 0   Feeling tired or having little energy 0   Poor appetite or overeating 0   Feeling bad about yourself - or that you are a failure or have let yourself or your family down 0   Trouble concentrating on things, such as reading the newspaper or watching television 0   Moving or speaking so slowly that other people could have noticed. Or the opposite - being so fidgety or restless that you have been moving around a lot more than usual 0   Thoughts that you would be better off dead, or of hurting yourself in some way 0   PHQ-2 Score 0   PHQ-9 Total Score 0   If you checked off any problems, how difficult have these problems made it for you to do your work, take care of things at home, or get along with other people? 0           8/28/2023     7:30 AM 2/15/2023    12:00 AM   [facility] AMB LEARNING ASSESSMENT   Primary Learner Patient Patient   Primary Language ENGLISH ENGLISH   Learning Preference DEMONSTRATION DEMONSTRATION   Answered By patient patient   Relationship to

## 2024-08-30 NOTE — PROGRESS NOTES
Pascale López is a 34 y.o. female  Chief Complaint   Patient presents with    Hypertension     6 month F/U    Depression     Wants to discuss situation depression.   Still has lorazepam from last fill.      HPI:  Hypertension.   Blood pressures in goal. Management at last visit included continuing current regimen. Current regimen: maxzide, toprol XL. Symptoms include no symptoms. Patient denies chest pain, palpitations. She is on Maxzide also for meniere's disease and is well controlled on that, usually takes QOD, jason well despite BP's on the low side.  Lab review:   Lab Results   Component Value Date     08/28/2023    K 4.0 08/28/2023     08/28/2023    CO2 23 08/28/2023    GLUCOSE 73 08/28/2023    BUN 15 08/28/2023    CREATININE 0.82 08/28/2023     Depression  On lexapro, wellbutrin XL. Working pretty good for now, happy with current regimen. Rare use lorazepam. Still has plenty from last year's Rx.  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 Followed by Neurosurgery Dr. Pollard. Starts in the centrum semiovale and has an irregular patchy appearance. Extends for about 1 cm anterior to posterior and extends 3 to 4 cm laterally into the precentral gyrus in the lateral frontal lobe. Due for 2y rpt MRI 2/2025.     Asthma  Current control: Good   Current level: mild intermittent  Current symptoms: none  Current controller: On Breztri, jason well.  Current side effects: none  Last flareup: Fall 2022.  Number of flareups in past year:3  Current symptom relief med: albuterol    PMH, SH, Medications/Allergies: reviewed, on chart. Works at  with [employer].    Current Outpatient Medications   Medication Sig    Budeson-Glycopyrrol-Formoterol (BREZTRI AEROSPHERE) 160-9-4.8 MCG/ACT AERO Inhale 2 puffs into the lungs in the morning and at  bedtime Indications: asthma    SUMAtriptan (IMITREX) 50 MG tablet Take 1 tablet by mouth daily as needed for Migraine    buPROPion (WELLBUTRIN XL) 300 MG extended release tablet Take 1 tablet by mouth daily Indications: nerves and depression    escitalopram (LEXAPRO) 20 MG tablet Take 1 tablet by mouth daily    metoprolol succinate (TOPROL XL) 25 MG extended release tablet Take 0.5 tablets by mouth daily    triamterene-hydroCHLOROthiazide (MAXZIDE-25) 37.5-25 MG per tablet Take 0.5-1 tablets by mouth daily    albuterol sulfate HFA (PROVENTIL;VENTOLIN;PROAIR) 108 (90 Base) MCG/ACT inhaler Inhale 2 puffs into the lungs every 4 hours as needed    Cetirizine HCl 10 MG CAPS Take by mouth    meclizine (ANTIVERT) 25 MG tablet Take 1 tablet by mouth 3 times daily as needed    norethindrone (MICRONOR) 0.35 MG tablet Take 1 tablet by mouth daily    ondansetron (ZOFRAN) 4 MG tablet Take 1 tablet by mouth every 8 hours as needed    polyethylene glycol (GLYCOLAX) 17 GM/SCOOP powder Take 17 g by mouth daily     No current facility-administered medications for this visit.     ROS:   General: No fever, chills, or abnormal weight loss  Respiratory: No cough, dyspnea  CV: No chest pain, palpitations    Objective:  Visit Vitals  /67 (Site: Left Upper Arm, Position: Sitting, Cuff Size: Medium Adult)   Pulse 82   Temp 98.2 °F (36.8 °C) (Temporal)   Resp 18   Ht 1.676 m (5' 6\")   Wt 54.6 kg (120 lb 6.4 oz)   SpO2 99%   BMI 19.43 kg/m²     Wt Readings from Last 3 Encounters:   08/30/24 54.6 kg (120 lb 6.4 oz)   02/28/24 54.3 kg (119 lb 9.6 oz)   08/28/23 53.9 kg (118 lb 12.8 oz)     BP Readings from Last 3 Encounters:   08/30/24 101/67   02/28/24 95/68   08/28/23 112/70     Physical Examination: General appearance - alert, well appearing, and in no distress  Mental status - alert, oriented to person, place, and time  Eyes - pupils equal and reactive, extraocular eye movements intact  ENT - bilateral external ears and nose normal.

## 2024-09-10 ENCOUNTER — NURSE ONLY (OUTPATIENT)
Age: 34
End: 2024-09-10

## 2024-09-10 DIAGNOSIS — I10 ESSENTIAL HYPERTENSION: ICD-10-CM

## 2024-09-11 LAB
BUN SERPL-MCNC: 10 MG/DL (ref 6–20)
BUN/CREAT SERPL: 17 (ref 9–23)
CALCIUM SERPL-MCNC: 9.6 MG/DL (ref 8.7–10.2)
CHLORIDE SERPL-SCNC: 101 MMOL/L (ref 96–106)
CO2 SERPL-SCNC: 26 MMOL/L (ref 20–29)
CREAT SERPL-MCNC: 0.58 MG/DL (ref 0.57–1)
EGFRCR SERPLBLD CKD-EPI 2021: 122 ML/MIN/1.73
GLUCOSE SERPL-MCNC: 87 MG/DL (ref 70–99)
POTASSIUM SERPL-SCNC: 3.8 MMOL/L (ref 3.5–5.2)
SODIUM SERPL-SCNC: 140 MMOL/L (ref 134–144)

## 2024-11-27 DIAGNOSIS — I10 ESSENTIAL HYPERTENSION: ICD-10-CM

## 2024-12-01 RX ORDER — METOPROLOL SUCCINATE 25 MG/1
12.5 TABLET, EXTENDED RELEASE ORAL DAILY
Qty: 45 TABLET | Refills: 3 | Status: SHIPPED | OUTPATIENT
Start: 2024-12-01

## 2025-02-25 ENCOUNTER — PATIENT MESSAGE (OUTPATIENT)
Age: 35
End: 2025-02-25

## 2025-02-25 DIAGNOSIS — F41.8 DEPRESSION WITH ANXIETY: ICD-10-CM

## 2025-02-26 RX ORDER — LORAZEPAM 1 MG/1
1 TABLET ORAL 2 TIMES DAILY PRN
Qty: 30 TABLET | Refills: 0 | Status: SHIPPED | OUTPATIENT
Start: 2025-02-26 | End: 2025-03-28

## 2025-02-26 NOTE — TELEPHONE ENCOUNTER
[facility] reviewed. Fill pattern in goal. Using sparingly, last filled ativan 2023 for #30.

## 2025-03-22 DIAGNOSIS — H81.09 MENIERE'S DISEASE, UNSPECIFIED LATERALITY: ICD-10-CM

## 2025-03-22 DIAGNOSIS — I10 ESSENTIAL HYPERTENSION: ICD-10-CM

## 2025-03-22 DIAGNOSIS — F41.8 DEPRESSION WITH ANXIETY: ICD-10-CM

## 2025-03-24 RX ORDER — BUPROPION HYDROCHLORIDE 300 MG/1
TABLET ORAL
Qty: 90 TABLET | Refills: 1 | Status: SHIPPED | OUTPATIENT
Start: 2025-03-24

## 2025-03-24 RX ORDER — TRIAMTERENE AND HYDROCHLOROTHIAZIDE 37.5; 25 MG/1; MG/1
TABLET ORAL
Qty: 90 TABLET | Refills: 1 | Status: SHIPPED | OUTPATIENT
Start: 2025-03-24

## 2025-03-24 RX ORDER — ESCITALOPRAM OXALATE 20 MG/1
20 TABLET ORAL DAILY
Qty: 90 TABLET | Refills: 1 | Status: SHIPPED | OUTPATIENT
Start: 2025-03-24

## 2025-09-05 ENCOUNTER — OFFICE VISIT (OUTPATIENT)
Age: 35
End: 2025-09-05
Payer: COMMERCIAL

## 2025-09-05 VITALS
HEIGHT: 66 IN | RESPIRATION RATE: 18 BRPM | SYSTOLIC BLOOD PRESSURE: 112 MMHG | WEIGHT: 120.4 LBS | BODY MASS INDEX: 19.35 KG/M2 | TEMPERATURE: 97.1 F | HEART RATE: 95 BPM | DIASTOLIC BLOOD PRESSURE: 64 MMHG | OXYGEN SATURATION: 99 %

## 2025-09-05 DIAGNOSIS — F41.8 DEPRESSION WITH ANXIETY: ICD-10-CM

## 2025-09-05 DIAGNOSIS — G93.9 BRAIN LESION: ICD-10-CM

## 2025-09-05 DIAGNOSIS — I10 ESSENTIAL HYPERTENSION: ICD-10-CM

## 2025-09-05 DIAGNOSIS — E55.9 VITAMIN D DEFICIENCY: Primary | ICD-10-CM

## 2025-09-05 DIAGNOSIS — G43.109 MIGRAINE WITH AURA AND WITHOUT STATUS MIGRAINOSUS, NOT INTRACTABLE: ICD-10-CM

## 2025-09-05 DIAGNOSIS — J45.20 MILD INTERMITTENT ASTHMA WITHOUT COMPLICATION: ICD-10-CM

## 2025-09-05 DIAGNOSIS — H81.09 MENIERE'S DISEASE, UNSPECIFIED LATERALITY: ICD-10-CM

## 2025-09-05 PROCEDURE — 3078F DIAST BP <80 MM HG: CPT | Performed by: FAMILY MEDICINE

## 2025-09-05 PROCEDURE — 99214 OFFICE O/P EST MOD 30 MIN: CPT | Performed by: FAMILY MEDICINE

## 2025-09-05 PROCEDURE — 3074F SYST BP LT 130 MM HG: CPT | Performed by: FAMILY MEDICINE

## 2025-09-05 RX ORDER — TRIAMTERENE AND HYDROCHLOROTHIAZIDE 37.5; 25 MG/1; MG/1
1 TABLET ORAL DAILY
Qty: 90 TABLET | Refills: 3 | Status: SHIPPED | OUTPATIENT
Start: 2025-09-05

## 2025-09-05 RX ORDER — BUDESONIDE, GLYCOPYRROLATE, AND FORMOTEROL FUMARATE 160; 9; 4.8 UG/1; UG/1; UG/1
2 AEROSOL, METERED RESPIRATORY (INHALATION) 2 TIMES DAILY
Qty: 10.7 G | Refills: 11 | Status: SHIPPED | OUTPATIENT
Start: 2025-09-05

## 2025-09-05 RX ORDER — BUPROPION HYDROCHLORIDE 300 MG/1
300 TABLET ORAL EVERY MORNING
Qty: 90 TABLET | Refills: 3 | Status: SHIPPED | OUTPATIENT
Start: 2025-09-05

## 2025-09-05 RX ORDER — PROPRANOLOL HYDROCHLORIDE 60 MG/1
60 CAPSULE, EXTENDED RELEASE ORAL DAILY
Qty: 30 CAPSULE | Refills: 11 | Status: SHIPPED | OUTPATIENT
Start: 2025-09-05

## 2025-09-05 RX ORDER — ESCITALOPRAM OXALATE 20 MG/1
20 TABLET ORAL DAILY
Qty: 90 TABLET | Refills: 3 | Status: SHIPPED | OUTPATIENT
Start: 2025-09-05

## 2025-09-05 SDOH — ECONOMIC STABILITY: FOOD INSECURITY: WITHIN THE PAST 12 MONTHS, THE FOOD YOU BOUGHT JUST DIDN'T LAST AND YOU DIDN'T HAVE MONEY TO GET MORE.: NEVER TRUE

## 2025-09-05 SDOH — ECONOMIC STABILITY: FOOD INSECURITY: WITHIN THE PAST 12 MONTHS, YOU WORRIED THAT YOUR FOOD WOULD RUN OUT BEFORE YOU GOT MONEY TO BUY MORE.: NEVER TRUE

## 2025-09-05 ASSESSMENT — PATIENT HEALTH QUESTIONNAIRE - PHQ9
1. LITTLE INTEREST OR PLEASURE IN DOING THINGS: NOT AT ALL
SUM OF ALL RESPONSES TO PHQ QUESTIONS 1-9: 0
SUM OF ALL RESPONSES TO PHQ QUESTIONS 1-9: 0
2. FEELING DOWN, DEPRESSED OR HOPELESS: NOT AT ALL
SUM OF ALL RESPONSES TO PHQ QUESTIONS 1-9: 0
SUM OF ALL RESPONSES TO PHQ QUESTIONS 1-9: 0